# Patient Record
Sex: FEMALE | Race: BLACK OR AFRICAN AMERICAN | NOT HISPANIC OR LATINO | Employment: OTHER | RURAL
[De-identification: names, ages, dates, MRNs, and addresses within clinical notes are randomized per-mention and may not be internally consistent; named-entity substitution may affect disease eponyms.]

---

## 2017-11-17 ENCOUNTER — HISTORICAL (OUTPATIENT)
Dept: ADMINISTRATIVE | Facility: HOSPITAL | Age: 67
End: 2017-11-17

## 2017-11-20 LAB
LAB AP CLINICAL INFORMATION: NORMAL
LAB AP DIAGNOSIS - HISTORICAL: NORMAL
LAB AP GROSS PATHOLOGY - HISTORICAL: NORMAL
LAB AP SPECIMEN SUBMITTED - HISTORICAL: NORMAL

## 2022-11-28 ENCOUNTER — TELEPHONE (OUTPATIENT)
Dept: GASTROENTEROLOGY | Facility: CLINIC | Age: 72
End: 2022-11-28
Payer: MEDICARE

## 2022-11-28 DIAGNOSIS — Z86.010 HX OF COLONIC POLYPS: Primary | ICD-10-CM

## 2022-12-01 LAB — HM MAMMOGRAM: NORMAL

## 2022-12-19 ENCOUNTER — HOSPITAL ENCOUNTER (EMERGENCY)
Facility: HOSPITAL | Age: 72
Discharge: HOME | End: 2022-12-19
Payer: MEDICARE

## 2022-12-19 VITALS
WEIGHT: 192 LBS | RESPIRATION RATE: 20 BRPM | TEMPERATURE: 98 F | OXYGEN SATURATION: 98 % | BODY MASS INDEX: 30.13 KG/M2 | HEART RATE: 94 BPM | DIASTOLIC BLOOD PRESSURE: 105 MMHG | HEIGHT: 67 IN | SYSTOLIC BLOOD PRESSURE: 166 MMHG

## 2022-12-19 DIAGNOSIS — M79.641 RIGHT HAND PAIN: ICD-10-CM

## 2022-12-19 DIAGNOSIS — M77.9 TENDONITIS: Primary | ICD-10-CM

## 2022-12-19 PROCEDURE — 99282 EMERGENCY DEPT VISIT SF MDM: CPT | Performed by: FAMILY MEDICINE

## 2022-12-19 PROCEDURE — 99281 EMR DPT VST MAYX REQ PHY/QHP: CPT

## 2022-12-19 RX ORDER — GLIPIZIDE 2.5 MG/1
2.5 TABLET, EXTENDED RELEASE ORAL EVERY MORNING
COMMUNITY
Start: 2022-11-21 | End: 2023-08-28 | Stop reason: SDUPTHER

## 2022-12-19 RX ORDER — CANDESARTAN CILEXETIL AND HYDROCHLOROTHIAZIDE 32; 25 MG/1; MG/1
1 TABLET ORAL
COMMUNITY
Start: 2022-11-22 | End: 2023-08-28 | Stop reason: SDUPTHER

## 2022-12-19 RX ORDER — PEN NEEDLE, DIABETIC 31 GX5/16"
NEEDLE, DISPOSABLE MISCELLANEOUS 2 TIMES DAILY
COMMUNITY
Start: 2022-11-22 | End: 2023-08-28 | Stop reason: SDUPTHER

## 2022-12-19 RX ORDER — INSULIN DEGLUDEC 200 U/ML
56 INJECTION, SOLUTION SUBCUTANEOUS DAILY
COMMUNITY
Start: 2022-11-22 | End: 2023-08-28 | Stop reason: SDUPTHER

## 2022-12-19 RX ORDER — MONTELUKAST SODIUM 10 MG/1
10 TABLET ORAL
COMMUNITY
Start: 2022-11-22 | End: 2024-02-28 | Stop reason: SDUPTHER

## 2022-12-19 RX ORDER — ROSUVASTATIN CALCIUM 10 MG/1
10 TABLET, COATED ORAL DAILY
COMMUNITY
Start: 2022-09-29 | End: 2023-08-28 | Stop reason: SDUPTHER

## 2022-12-19 RX ORDER — FLUTICASONE PROPIONATE 50 MCG
SPRAY, SUSPENSION (ML) NASAL
COMMUNITY
Start: 2022-11-22 | End: 2024-02-28

## 2022-12-19 RX ORDER — BLOOD SUGAR DIAGNOSTIC
STRIP MISCELLANEOUS 2 TIMES DAILY
COMMUNITY
Start: 2022-08-09 | End: 2023-08-28 | Stop reason: SDUPTHER

## 2022-12-19 RX ORDER — LIRAGLUTIDE 6 MG/ML
1.2 INJECTION SUBCUTANEOUS NIGHTLY
COMMUNITY
Start: 2022-11-21 | End: 2023-08-28 | Stop reason: SDUPTHER

## 2022-12-19 NOTE — ED PROVIDER NOTES
Encounter Date: 12/19/2022       History     Chief Complaint   Patient presents with    Hand Pain     X 3 weeks     Patient comes in with pain in both of her hands.  The majority of the pain is coming from her right thumb.  She seems to have a positive Finkelstein's test on the right and probably  dequavans tenosynovitis      Review of patient's allergies indicates:  No Known Allergies  Past Medical History:   Diagnosis Date    Breast cancer in female     Cancer     Diabetes mellitus     Hypertension     Mixed hyperlipidemia      History reviewed. No pertinent surgical history.  History reviewed. No pertinent family history.  Social History     Tobacco Use    Smoking status: Never    Smokeless tobacco: Never   Substance Use Topics    Drug use: Never     Review of Systems   Constitutional: Negative.  Negative for fever.   HENT: Negative.  Negative for sore throat.    Eyes: Negative.    Respiratory: Negative.  Negative for shortness of breath.    Cardiovascular: Negative.  Negative for chest pain.   Gastrointestinal: Negative.  Negative for nausea.   Endocrine: Negative.    Genitourinary: Negative.  Negative for dysuria.   Musculoskeletal: Negative.  Negative for back pain.   Skin: Negative.  Negative for rash.   Allergic/Immunologic: Negative.    Neurological: Negative.  Negative for weakness.   Hematological: Negative.  Does not bruise/bleed easily.   Psychiatric/Behavioral: Negative.     All other systems reviewed and are negative.    Physical Exam     Initial Vitals [12/19/22 1415]   BP Pulse Resp Temp SpO2   (!) 166/105 94 20 97.8 °F (36.6 °C) 98 %      MAP       --         Physical Exam    Constitutional: She appears well-developed and well-nourished.   HENT:   Head: Normocephalic and atraumatic.   Right Ear: External ear normal.   Left Ear: External ear normal.   Nose: Nose normal.   Mouth/Throat: Oropharynx is clear and moist.   Eyes: Conjunctivae and EOM are normal. Pupils are equal, round, and reactive to  light.   Neck: Neck supple.   Normal range of motion.  Cardiovascular:  Normal rate, regular rhythm, normal heart sounds and intact distal pulses.           Pulmonary/Chest: Breath sounds normal.   Abdominal: Abdomen is soft. Bowel sounds are normal.   Genitourinary:    Vagina and uterus normal.     Musculoskeletal:         General: Normal range of motion.      Cervical back: Normal range of motion and neck supple.      Comments: Positive Finkelstein's study on the right will need to do a spur cock-up splint of the thumb a script was written for her to go get 1 healed.     Neurological: She is alert and oriented to person, place, and time. She has normal strength and normal reflexes.   Skin: Skin is warm. Capillary refill takes less than 2 seconds.   Psychiatric: She has a normal mood and affect. Her behavior is normal. Judgment and thought content normal.       Medical Screening Exam   See Full Note    ED Course   Procedures  Labs Reviewed - No data to display       Imaging Results    None          Medications - No data to display                    Clinical Impression:   Final diagnoses:  [M77.9] Tendonitis (Primary)  [M79.641] Right hand pain               Wil Napoles, DO  12/19/22 1536

## 2022-12-19 NOTE — ED TRIAGE NOTES
C/o pain in bilateral hand and wrists. States is having a hard time gripping things and this has been going on x 3 weeks.

## 2022-12-28 ENCOUNTER — ANESTHESIA EVENT (OUTPATIENT)
Dept: GASTROENTEROLOGY | Facility: HOSPITAL | Age: 72
End: 2022-12-28
Payer: MEDICARE

## 2022-12-28 ENCOUNTER — ANESTHESIA (OUTPATIENT)
Dept: GASTROENTEROLOGY | Facility: HOSPITAL | Age: 72
End: 2022-12-28
Payer: MEDICARE

## 2022-12-28 ENCOUNTER — HOSPITAL ENCOUNTER (OUTPATIENT)
Dept: GASTROENTEROLOGY | Facility: HOSPITAL | Age: 72
Discharge: HOME | End: 2022-12-28
Attending: INTERNAL MEDICINE
Payer: MEDICARE

## 2022-12-28 VITALS
DIASTOLIC BLOOD PRESSURE: 115 MMHG | HEART RATE: 87 BPM | OXYGEN SATURATION: 99 % | SYSTOLIC BLOOD PRESSURE: 143 MMHG | RESPIRATION RATE: 30 BRPM | TEMPERATURE: 98 F | HEIGHT: 67 IN | BODY MASS INDEX: 30.13 KG/M2 | WEIGHT: 192 LBS

## 2022-12-28 DIAGNOSIS — Z86.010 HX OF COLONIC POLYPS: ICD-10-CM

## 2022-12-28 DIAGNOSIS — Z12.11 SCREENING FOR COLON CANCER: Primary | ICD-10-CM

## 2022-12-28 DIAGNOSIS — K63.5 POLYP OF TRANSVERSE COLON, UNSPECIFIED TYPE: ICD-10-CM

## 2022-12-28 DIAGNOSIS — K57.30 DIVERTICULOSIS OF COLON: ICD-10-CM

## 2022-12-28 PROBLEM — Z86.0100 HX OF COLONIC POLYPS: Status: ACTIVE | Noted: 2022-12-28

## 2022-12-28 LAB — GLUCOSE SERPL-MCNC: 135 MG/DL (ref 70–105)

## 2022-12-28 PROCEDURE — 88305 SURGICAL PATHOLOGY: ICD-10-PCS | Mod: 26,,, | Performed by: PATHOLOGY

## 2022-12-28 PROCEDURE — 88305 TISSUE EXAM BY PATHOLOGIST: CPT | Mod: TC,SUR | Performed by: INTERNAL MEDICINE

## 2022-12-28 PROCEDURE — 27000284 HC CANNULA NASAL: Performed by: NURSE ANESTHETIST, CERTIFIED REGISTERED

## 2022-12-28 PROCEDURE — 45380 COLONOSCOPY AND BIOPSY: CPT | Mod: PT | Performed by: INTERNAL MEDICINE

## 2022-12-28 PROCEDURE — D9220A PRA ANESTHESIA: ICD-10-PCS | Mod: PT,,, | Performed by: NURSE ANESTHETIST, CERTIFIED REGISTERED

## 2022-12-28 PROCEDURE — 88305 TISSUE EXAM BY PATHOLOGIST: CPT | Mod: 26,,, | Performed by: PATHOLOGY

## 2022-12-28 PROCEDURE — 82962 GLUCOSE BLOOD TEST: CPT

## 2022-12-28 PROCEDURE — 82962 GLUCOSE BLOOD TEST: CPT | Mod: 91

## 2022-12-28 PROCEDURE — 63600175 PHARM REV CODE 636 W HCPCS: Performed by: NURSE ANESTHETIST, CERTIFIED REGISTERED

## 2022-12-28 PROCEDURE — 25000003 PHARM REV CODE 250: Performed by: NURSE ANESTHETIST, CERTIFIED REGISTERED

## 2022-12-28 PROCEDURE — 37000009 HC ANESTHESIA EA ADD 15 MINS

## 2022-12-28 PROCEDURE — 37000008 HC ANESTHESIA 1ST 15 MINUTES

## 2022-12-28 PROCEDURE — 27201423 OPTIME MED/SURG SUP & DEVICES STERILE SUPPLY

## 2022-12-28 PROCEDURE — D9220A PRA ANESTHESIA: Mod: PT,,, | Performed by: NURSE ANESTHETIST, CERTIFIED REGISTERED

## 2022-12-28 RX ORDER — SODIUM CHLORIDE 0.9 % (FLUSH) 0.9 %
10 SYRINGE (ML) INJECTION
Status: DISCONTINUED | OUTPATIENT
Start: 2022-12-28 | End: 2022-12-29 | Stop reason: HOSPADM

## 2022-12-28 RX ORDER — LIDOCAINE HYDROCHLORIDE 20 MG/ML
INJECTION, SOLUTION EPIDURAL; INFILTRATION; INTRACAUDAL; PERINEURAL
Status: DISCONTINUED | OUTPATIENT
Start: 2022-12-28 | End: 2022-12-28

## 2022-12-28 RX ORDER — PROPOFOL 10 MG/ML
VIAL (ML) INTRAVENOUS
Status: DISCONTINUED | OUTPATIENT
Start: 2022-12-28 | End: 2022-12-28

## 2022-12-28 RX ADMIN — PROPOFOL 30 MG: 10 INJECTION, EMULSION INTRAVENOUS at 01:12

## 2022-12-28 RX ADMIN — PROPOFOL 20 MG: 10 INJECTION, EMULSION INTRAVENOUS at 01:12

## 2022-12-28 RX ADMIN — SODIUM CHLORIDE: 9 INJECTION, SOLUTION INTRAVENOUS at 12:12

## 2022-12-28 RX ADMIN — PROPOFOL 100 MG: 10 INJECTION, EMULSION INTRAVENOUS at 01:12

## 2022-12-28 RX ADMIN — LIDOCAINE HYDROCHLORIDE 100 MG: 20 INJECTION, SOLUTION INTRAVENOUS at 01:12

## 2022-12-28 RX ADMIN — PROPOFOL 40 MG: 10 INJECTION, EMULSION INTRAVENOUS at 01:12

## 2022-12-28 NOTE — H&P
[facility]  Gastroenterology  H&P    Patient Name: Luna Wolf  MRN: [id number]  Admission Date: 12/28/2022  Code Status: Full Code    Attending Provider: Mark Norman MD   Primary Care Physician: Primary Doctor No  Principal Problem:<principal problem not specified>    Subjective:     History of Present Illness: Pt has history of colon polyps. Her last colonoscopy was five years ago.    Past Medical History:   Diagnosis Date    Breast cancer in female     Cancer     Diabetes mellitus     Hypertension     Mixed hyperlipidemia        Past Surgical History:   Procedure Laterality Date    BREAST BIOPSY      TUBAL LIGATION         Review of patient's allergies indicates:  No Known Allergies  Family History    None       Tobacco Use    Smoking status: Never    Smokeless tobacco: Never   Substance and Sexual Activity    Alcohol use: Never    Drug use: Never    Sexual activity: Not on file     Review of Systems   Respiratory: Negative.     Cardiovascular: Negative.    Gastrointestinal: Negative.    Objective:     Vital Signs (Most Recent):  Pulse: 100 (12/28/22 1235)  Resp: 16 (12/28/22 1235)  BP: 123/75 (12/28/22 1235)  SpO2: 97 % (12/28/22 1235) Vital Signs (24h Range):  Pulse:  [100] 100  Resp:  [16] 16  SpO2:  [97 %] 97 %  BP: (123)/(75) 123/75     Weight: 87.1 kg (192 lb) (12/28/22 1235)  Body mass index is 30.07 kg/m².    No intake or output data in the 24 hours ending 12/28/22 1314    Lines/Drains/Airways       Peripheral Intravenous Line  Duration                  Peripheral IV - Single Lumen 12/28/22 1235 22 G Anterior;Left Forearm <1 day                    Physical Exam  Vitals reviewed.   Constitutional:       General: She is not in acute distress.     Appearance: Normal appearance. She is well-developed. She is not ill-appearing.   HENT:      Head: Normocephalic and atraumatic.      Nose: Nose normal.   Eyes:      Pupils: Pupils are equal, round, and reactive to light.   Cardiovascular:      Rate  and Rhythm: Normal rate and regular rhythm.   Pulmonary:      Effort: Pulmonary effort is normal.      Breath sounds: Normal breath sounds. No wheezing.   Abdominal:      General: Abdomen is flat. Bowel sounds are normal. There is no distension.      Palpations: Abdomen is soft.      Tenderness: There is no abdominal tenderness. There is no guarding.   Skin:     General: Skin is warm and dry.      Coloration: Skin is not jaundiced.   Neurological:      Mental Status: She is alert.   Psychiatric:         Attention and Perception: Attention normal.         Mood and Affect: Affect normal.         Speech: Speech normal.         Behavior: Behavior is cooperative.      Comments: Pt was calm while speaking.       Significant Labs:  CBC: No results for input(s): WBC, HGB, HCT, PLT in the last 48 hours.  CMP: No results for input(s): GLU, CALCIUM, ALBUMIN, PROT, NA, K, CO2, CL, BUN, CREATININE, ALKPHOS, ALT, AST, BILITOT in the last 48 hours.    Significant Imaging:  Imaging results within the past 24 hours have been reviewed.    Assessment/Plan:     There are no hospital problems to display for this patient.        Imp: History of colon polyps  Plan: colonoscopy    Mark Norman MD  Gastroenterology  [facility]

## 2022-12-28 NOTE — ANESTHESIA PREPROCEDURE EVALUATION
"                                                                                                             12/28/2022  Luna Wolf is a 72 y.o., female.    Past Medical History:   Diagnosis Date    Breast cancer in female     Cancer     Diabetes mellitus     Hypertension     Mixed hyperlipidemia        Past Surgical History:   Procedure Laterality Date    BREAST BIOPSY      TUBAL LIGATION         History reviewed. No pertinent family history.    Social History     Socioeconomic History    Marital status: Single   Tobacco Use    Smoking status: Never    Smokeless tobacco: Never   Substance and Sexual Activity    Alcohol use: Never    Drug use: Never       Current Outpatient Medications   Medication Sig Dispense Refill    ATACAND HCT 32-25 mg Tab Take 1 tablet by mouth.      ACCU-CHEK GAY PLUS TEST STRP Strp 2 (two) times daily. Test      BD ULTRA-FINE MINI PEN NEEDLE 31 gauge x 3/16" Ndle 2 (two) times daily.      fluticasone propionate (FLONASE) 50 mcg/actuation nasal spray by Each Nostril route.      glipiZIDE (GLUCOTROL) 2.5 MG TR24 Take 2.5 mg by mouth every morning.      montelukast (SINGULAIR) 10 mg tablet Take 10 mg by mouth.      rosuvastatin (CRESTOR) 10 MG tablet Take 10 mg by mouth.      TRESIBA FLEXTOUCH U-200 200 unit/mL (3 mL) insulin pen 60 units      VICTOZA 3-MAJO 0.6 mg/0.1 mL (18 mg/3 mL) PnIj pen Inject 1.8 mg into the skin.       No current facility-administered medications for this encounter.       Review of patient's allergies indicates:  No Known Allergies    Pre-op Assessment    I have reviewed the Patient Summary Reports.     I have reviewed the Nursing Notes. I have reviewed the NPO Status.   I have reviewed the Medications.     Review of Systems      Physical Exam    Airway:  Mallampati: III / II  Mouth Opening: Normal  TM Distance: 4 - 6 cm  Tongue: Normal  Neck ROM: Normal ROM        Anesthesia Plan  Type of Anesthesia, risks & benefits discussed:    Anesthesia " Type: Gen Natural Airway  Intra-op Monitoring Plan: Standard ASA Monitors  Post Op Pain Control Plan: multimodal analgesia  Induction:  IV  Informed Consent: Informed consent signed with the Patient and all parties understand the risks and agree with anesthesia plan.  All questions answered. Patient consented to blood products? Yes  ASA Score: 3  Day of Surgery Review of History & Physical: H&P Update referred to the surgeon/provider.    Ready For Surgery From Anesthesia Perspective.     .

## 2022-12-28 NOTE — DISCHARGE INSTRUCTIONS
12/28/22     Impression  Overall Impression: Scattered diverticula are present in the sigmoid, descending and transverse colon. One diminutive transverse colon polyp was removed with biopsy.     Recommendation    Await pathology results     Repeat colonoscopy in 5 years      High fiber diet  Disp: DC to home in stable condition. Resume diet and activity. No driving x 24 hours. F/U with PCP as scheduled. Dx: History of colon polyps, colon diverticulosis, one polyp was removed on this exam  .Drink fluids, no operating heavy machinery, driving, or signing legal documents until tomorrow.

## 2022-12-28 NOTE — TRANSFER OF CARE
"Anesthesia Transfer of Care Note    Patient: Luna Wolf    Procedure(s) Performed: Colonoscopy    Patient location: GI    Anesthesia Type: general    Transport from OR: Transported from OR on room air with adequate spontaneous ventilation    Post pain: adequate analgesia    Post assessment: no apparent anesthetic complications and tolerated procedure well    Post vital signs: stable    Level of consciousness: responds to stimulation and oriented    Nausea/Vomiting: no nausea/vomiting    Complications: none    Transfer of care protocol was followed      Last vitals:   Visit Vitals  /78 (BP Location: Left leg, Patient Position: Lying)   Pulse 82   Temp 36.6 °C (97.8 °F) (Skin)   Resp 16   Ht 5' 7" (1.702 m)   Wt 87.1 kg (192 lb)   SpO2 100%   Breastfeeding No   BMI 30.07 kg/m²     "

## 2022-12-28 NOTE — ANESTHESIA POSTPROCEDURE EVALUATION
Anesthesia Post Evaluation    Patient: Luna Wolf    Procedure(s) Performed: Colonoscopy    Final Anesthesia Type: general      Patient location during evaluation: GI PACU  Patient participation: Yes- Able to Participate  Level of consciousness: awake and alert  Post-procedure vital signs: reviewed and stable  Pain management: adequate  Airway patency: patent    PONV status at discharge: No PONV  Anesthetic complications: no      Cardiovascular status: stable  Respiratory status: unassisted  Hydration status: euvolemic  Follow-up not needed.          Vitals Value Taken Time   /87 12/28/22 1356   Temp 36.6 °C (97.8 °F) 12/28/22 1330   Pulse 91 12/28/22 1358   Resp 21 12/28/22 1358   SpO2 99 % 12/28/22 1356   Vitals shown include unvalidated device data.      No case tracking events are documented in the log.      Pain/Lilo Score: Lilo Score: 9 (12/28/2022  1:35 PM)

## 2022-12-29 LAB
ESTROGEN SERPL-MCNC: NORMAL PG/ML
INSULIN SERPL-ACNC: NORMAL U[IU]/ML
LAB AP GROSS DESCRIPTION: NORMAL
LAB AP LABORATORY NOTES: NORMAL
T3RU NFR SERPL: NORMAL %

## 2022-12-30 ENCOUNTER — TELEPHONE (OUTPATIENT)
Dept: GASTROENTEROLOGY | Facility: CLINIC | Age: 72
End: 2022-12-30
Payer: MEDICARE

## 2022-12-30 NOTE — TELEPHONE ENCOUNTER
Called patient to discuss results and verbalized understanding.        ----- Message from Mark Norman MD sent at 12/29/2022  4:52 PM CST -----  Place pt on list for colonoscopy in 5 years; polyp was ta.

## 2023-01-26 ENCOUNTER — OFFICE VISIT (OUTPATIENT)
Dept: ORTHOPEDICS | Facility: CLINIC | Age: 73
End: 2023-01-26
Payer: MEDICARE

## 2023-01-26 ENCOUNTER — HOSPITAL ENCOUNTER (OUTPATIENT)
Dept: RADIOLOGY | Facility: HOSPITAL | Age: 73
Discharge: HOME | End: 2023-01-26
Attending: ORTHOPAEDIC SURGERY
Payer: MEDICARE

## 2023-01-26 VITALS — BODY MASS INDEX: 30.13 KG/M2 | HEIGHT: 67 IN | WEIGHT: 192 LBS

## 2023-01-26 DIAGNOSIS — M79.641 RIGHT HAND PAIN: ICD-10-CM

## 2023-01-26 DIAGNOSIS — M19.031 OSTEOARTHRITIS OF RIGHT WRIST, UNSPECIFIED OSTEOARTHRITIS TYPE: Primary | ICD-10-CM

## 2023-01-26 DIAGNOSIS — M77.9 TENDONITIS: ICD-10-CM

## 2023-01-26 PROCEDURE — 99203 PR OFFICE/OUTPT VISIT, NEW, LEVL III, 30-44 MIN: ICD-10-PCS | Mod: S$PBB,,, | Performed by: ORTHOPAEDIC SURGERY

## 2023-01-26 PROCEDURE — 73130 X-RAY EXAM OF HAND: CPT | Mod: 26,RT,, | Performed by: ORTHOPAEDIC SURGERY

## 2023-01-26 PROCEDURE — 73130 X-RAY EXAM OF HAND: CPT | Mod: TC,RT

## 2023-01-26 PROCEDURE — 73130 XR HAND COMPLETE 3 VIEW RIGHT: ICD-10-PCS | Mod: 26,RT,, | Performed by: ORTHOPAEDIC SURGERY

## 2023-01-26 PROCEDURE — 99213 OFFICE O/P EST LOW 20 MIN: CPT | Mod: PBBFAC | Performed by: ORTHOPAEDIC SURGERY

## 2023-01-26 PROCEDURE — 3008F BODY MASS INDEX DOCD: CPT | Mod: CPTII,,, | Performed by: ORTHOPAEDIC SURGERY

## 2023-01-26 PROCEDURE — 3008F PR BODY MASS INDEX (BMI) DOCUMENTED: ICD-10-PCS | Mod: CPTII,,, | Performed by: ORTHOPAEDIC SURGERY

## 2023-01-26 PROCEDURE — 99203 OFFICE O/P NEW LOW 30 MIN: CPT | Mod: S$PBB,,, | Performed by: ORTHOPAEDIC SURGERY

## 2023-01-26 NOTE — PROGRESS NOTES
"    HPI:   Luna Wolf is a pleasant 72 y.o. patient who reports to clinic for evaluation of bilateral hand pain and swelling.  Her right hand is the most significantly affected by her left hand symptomatic as well.  States she has a difficult time gripping and grasping objects.  The pain was so bad that she went to the emergency department.  They diagnosed her with de Quervain tendinitis.  No x-rays were performed at that time.  Denies any acute traumatic event.  Injury onset and description: none  Patient's occupation: Retired  This is not a work related injury.   This injury has been non-responsive to conservative care. The pain is worse with repetitive use, and strenuous activity is very difficult.  her pain improves with rest.  she rates pain as a  5/10on the Visual Analog Scale.        PAST MEDICAL HISTORY:   Past Medical History:   Diagnosis Date    Breast cancer in female     Cancer     Diabetes mellitus     Hypertension     Mixed hyperlipidemia      PAST SURGICAL HISTORY:   Past Surgical History:   Procedure Laterality Date    BREAST BIOPSY      TUBAL LIGATION       MEDICATIONS:    Current Outpatient Medications:     ACCU-CHEK GAY PLUS TEST STRP Strp, 2 (two) times daily. Test, Disp: , Rfl:     ATACAND HCT 32-25 mg Tab, Take 1 tablet by mouth., Disp: , Rfl:     BD ULTRA-FINE MINI PEN NEEDLE 31 gauge x 3/16" Ndle, 2 (two) times daily., Disp: , Rfl:     fluticasone propionate (FLONASE) 50 mcg/actuation nasal spray, by Each Nostril route., Disp: , Rfl:     glipiZIDE (GLUCOTROL) 2.5 MG TR24, Take 2.5 mg by mouth every morning., Disp: , Rfl:     montelukast (SINGULAIR) 10 mg tablet, Take 10 mg by mouth., Disp: , Rfl:     rosuvastatin (CRESTOR) 10 MG tablet, Take 10 mg by mouth., Disp: , Rfl:     TRESIBA FLEXTOUCH U-200 200 unit/mL (3 mL) insulin pen, 60 units, Disp: , Rfl:     VICTOZA 3-MAJO 0.6 mg/0.1 mL (18 mg/3 mL) PnIj pen, Inject 1.8 mg into the skin., Disp: , Rfl:   ALLERGIES:   Review of patient's " "allergies indicates:  No Known Allergies      PHYSICAL EXAM:  VITAL SIGNS: Ht 5' 7" (1.702 m)   Wt 87.1 kg (192 lb)   BMI 30.07 kg/m²   General: Well-developed well-nourished 72 y.o. femalein no acute distress;Cardiovascular: Regular rhythm by palpation of distal pulse, normal color and temperature, no concerning varicosities on symptomatic side Lungs: No labored breathing or wheezing appreciated Neuro: Alert and oriented ×3 Psychiatric: well oriented to person, place and time, demonstrates normal mood and affect Skin: No rashes, lesions or ulcers, normal temperature, turgor, and texture on uninvolved extremity    Ortho/SPM Exam  Her hand was inspected today.  She has marked deformity seen to the 1st CMC joint and radiocarpal joint of the right hand and also to the left hand as well the to a lesser degree.  Limited radiocarpal range of motion is present bilaterally.  Positive thumb CMC grind test is present bilaterally.   strength appears to be greatly diminished compared to normal.  She is neurovascularly intact.  Somewhat diminished sensation in the small finger and ring finger bilaterally    IMAGING:  X-Ray Hand 3 View Right    Result Date: 1/26/2023  See Procedure Notes for results. IMPRESSION: Please see Ortho procedure notes for report.  This procedure was auto-finalized by: Virtual Radiologist  Radiographs right hand were obtained today demonstrating severe 1st CMC joint osteoarthritis with severe degenerative changes present throughout the radiocarpal joint.  ASSESSMENT:      ICD-10-CM ICD-9-CM   1. Osteoarthritis of right wrist, unspecified osteoarthritis type  M19.031 715.93   2. Tendonitis  M77.9 726.90       PLAN:     -Findings and treatment options were discussed with the patient  -All questions answered  Severe wrist arthritis-patient requested a wrist brace and this was given today.  Cannot tolerated NSAIDS- stomach ulcers occur  Recommend topical voltaren gel  Mentioned potential surgical " interventions-- she is not interested  Injections discussed-- multiple sources of OA-- CMC joint, wrist. Difficult to address all of these areas of arthritis with 1 or even 2 injections.     There are no Patient Instructions on file for this visit.  Orders Placed This Encounter   Procedures    X-Ray Hand 3 View Right     Standing Status:   Future     Number of Occurrences:   1     Standing Expiration Date:   1/26/2024     Order Specific Question:   May the Radiologist modify the order per protocol to meet the clinical needs of the patient?     Answer:   Yes     Order Specific Question:   Release to patient     Answer:   Immediate     Procedures

## 2023-03-16 ENCOUNTER — OUTSIDE PLACE OF SERVICE (OUTPATIENT)
Dept: CARDIOLOGY | Facility: HOSPITAL | Age: 73
End: 2023-03-16
Payer: MEDICARE

## 2023-03-16 PROCEDURE — 93010 PR ELECTROCARDIOGRAM REPORT: ICD-10-PCS | Mod: ,,, | Performed by: INTERNAL MEDICINE

## 2023-03-16 PROCEDURE — 93010 ELECTROCARDIOGRAM REPORT: CPT | Mod: ,,, | Performed by: INTERNAL MEDICINE

## 2023-04-06 ENCOUNTER — OUTSIDE PLACE OF SERVICE (OUTPATIENT)
Dept: CARDIOLOGY | Facility: HOSPITAL | Age: 73
End: 2023-04-06
Payer: MEDICARE

## 2023-04-06 PROCEDURE — 93010 ELECTROCARDIOGRAM REPORT: CPT | Mod: ,,, | Performed by: INTERNAL MEDICINE

## 2023-04-06 PROCEDURE — 93010 PR ELECTROCARDIOGRAM REPORT: ICD-10-PCS | Mod: ,,, | Performed by: INTERNAL MEDICINE

## 2023-04-10 ENCOUNTER — HOSPITAL ENCOUNTER (OUTPATIENT)
Dept: CARDIOLOGY | Facility: HOSPITAL | Age: 73
Discharge: HOME | End: 2023-04-10
Attending: INTERNAL MEDICINE

## 2023-04-10 DIAGNOSIS — R60.0 LOCALIZED EDEMA: ICD-10-CM

## 2023-04-10 DIAGNOSIS — R60.0 LOCALIZED EDEMA: Primary | ICD-10-CM

## 2023-04-10 PROCEDURE — 93306 TTE W/DOPPLER COMPLETE: CPT | Mod: 26,,, | Performed by: INTERNAL MEDICINE

## 2023-04-10 PROCEDURE — 93306 CV EXTERNAL ECHO (CUPID ONLY): ICD-10-PCS | Mod: 26,,, | Performed by: INTERNAL MEDICINE

## 2023-04-11 LAB — EJECTION FRACTION: 70 %

## 2023-08-28 ENCOUNTER — OFFICE VISIT (OUTPATIENT)
Dept: FAMILY MEDICINE | Facility: CLINIC | Age: 73
End: 2023-08-28
Payer: MEDICARE

## 2023-08-28 VITALS
BODY MASS INDEX: 22.91 KG/M2 | RESPIRATION RATE: 18 BRPM | OXYGEN SATURATION: 97 % | TEMPERATURE: 99 F | HEIGHT: 68 IN | WEIGHT: 151.19 LBS | DIASTOLIC BLOOD PRESSURE: 81 MMHG | HEART RATE: 106 BPM | SYSTOLIC BLOOD PRESSURE: 144 MMHG

## 2023-08-28 DIAGNOSIS — E11.69 TYPE 2 DIABETES MELLITUS WITH OTHER SPECIFIED COMPLICATION, WITH LONG-TERM CURRENT USE OF INSULIN: Primary | ICD-10-CM

## 2023-08-28 DIAGNOSIS — Z13.820 SCREENING FOR OSTEOPOROSIS: ICD-10-CM

## 2023-08-28 DIAGNOSIS — Z78.0 ASYMPTOMATIC MENOPAUSAL STATE: ICD-10-CM

## 2023-08-28 DIAGNOSIS — M48.02 CERVICAL SPINAL STENOSIS: Chronic | ICD-10-CM

## 2023-08-28 DIAGNOSIS — Z11.59 ENCOUNTER FOR HEPATITIS C SCREENING TEST FOR LOW RISK PATIENT: ICD-10-CM

## 2023-08-28 DIAGNOSIS — Z79.4 TYPE 2 DIABETES MELLITUS WITH OTHER SPECIFIED COMPLICATION, WITH LONG-TERM CURRENT USE OF INSULIN: Primary | ICD-10-CM

## 2023-08-28 DIAGNOSIS — I10 ESSENTIAL HYPERTENSION: ICD-10-CM

## 2023-08-28 DIAGNOSIS — H91.8X3 OTHER SPECIFIED HEARING LOSS OF BOTH EARS: ICD-10-CM

## 2023-08-28 DIAGNOSIS — E78.2 MIXED HYPERLIPIDEMIA: ICD-10-CM

## 2023-08-28 PROBLEM — Z86.0100 HX OF COLONIC POLYPS: Chronic | Status: ACTIVE | Noted: 2022-12-28

## 2023-08-28 PROBLEM — Z12.11 SCREENING FOR COLON CANCER: Status: RESOLVED | Noted: 2022-12-28 | Resolved: 2023-08-28

## 2023-08-28 PROBLEM — E11.9 TYPE 2 DIABETES MELLITUS, WITH LONG-TERM CURRENT USE OF INSULIN: Chronic | Status: ACTIVE | Noted: 2023-08-28

## 2023-08-28 PROBLEM — Z86.010 HX OF COLONIC POLYPS: Chronic | Status: ACTIVE | Noted: 2022-12-28

## 2023-08-28 PROBLEM — K57.30 DIVERTICULOSIS OF COLON: Chronic | Status: ACTIVE | Noted: 2022-12-28

## 2023-08-28 PROBLEM — K63.5 POLYP OF TRANSVERSE COLON: Status: RESOLVED | Noted: 2022-12-28 | Resolved: 2023-08-28

## 2023-08-28 PROCEDURE — 86803 HEPATITIS C ANTIBODY: ICD-10-PCS | Mod: ,,, | Performed by: CLINICAL MEDICAL LABORATORY

## 2023-08-28 PROCEDURE — 82043 UR ALBUMIN QUANTITATIVE: CPT | Mod: ,,, | Performed by: CLINICAL MEDICAL LABORATORY

## 2023-08-28 PROCEDURE — 86803 HEPATITIS C AB TEST: CPT | Mod: ,,, | Performed by: CLINICAL MEDICAL LABORATORY

## 2023-08-28 PROCEDURE — 4010F ACE/ARB THERAPY RXD/TAKEN: CPT | Mod: ,,, | Performed by: FAMILY MEDICINE

## 2023-08-28 PROCEDURE — 80053 COMPREHENSIVE METABOLIC PANEL: ICD-10-PCS | Mod: ,,, | Performed by: CLINICAL MEDICAL LABORATORY

## 2023-08-28 PROCEDURE — 1160F RVW MEDS BY RX/DR IN RCRD: CPT | Mod: ,,, | Performed by: FAMILY MEDICINE

## 2023-08-28 PROCEDURE — 3079F PR MOST RECENT DIASTOLIC BLOOD PRESSURE 80-89 MM HG: ICD-10-PCS | Mod: ,,, | Performed by: FAMILY MEDICINE

## 2023-08-28 PROCEDURE — 85025 COMPLETE CBC W/AUTO DIFF WBC: CPT | Mod: ,,, | Performed by: CLINICAL MEDICAL LABORATORY

## 2023-08-28 PROCEDURE — 1159F PR MEDICATION LIST DOCUMENTED IN MEDICAL RECORD: ICD-10-PCS | Mod: ,,, | Performed by: FAMILY MEDICINE

## 2023-08-28 PROCEDURE — 3288F PR FALLS RISK ASSESSMENT DOCUMENTED: ICD-10-PCS | Mod: ,,, | Performed by: FAMILY MEDICINE

## 2023-08-28 PROCEDURE — 80061 LIPID PANEL: ICD-10-PCS | Mod: ,,, | Performed by: CLINICAL MEDICAL LABORATORY

## 2023-08-28 PROCEDURE — 3077F PR MOST RECENT SYSTOLIC BLOOD PRESSURE >= 140 MM HG: ICD-10-PCS | Mod: ,,, | Performed by: FAMILY MEDICINE

## 2023-08-28 PROCEDURE — 3079F DIAST BP 80-89 MM HG: CPT | Mod: ,,, | Performed by: FAMILY MEDICINE

## 2023-08-28 PROCEDURE — 1101F PR PT FALLS ASSESS DOC 0-1 FALLS W/OUT INJ PAST YR: ICD-10-PCS | Mod: ,,, | Performed by: FAMILY MEDICINE

## 2023-08-28 PROCEDURE — 85025 CBC WITH DIFFERENTIAL: ICD-10-PCS | Mod: ,,, | Performed by: CLINICAL MEDICAL LABORATORY

## 2023-08-28 PROCEDURE — 3008F PR BODY MASS INDEX (BMI) DOCUMENTED: ICD-10-PCS | Mod: ,,, | Performed by: FAMILY MEDICINE

## 2023-08-28 PROCEDURE — 1159F MED LIST DOCD IN RCRD: CPT | Mod: ,,, | Performed by: FAMILY MEDICINE

## 2023-08-28 PROCEDURE — 82570 ASSAY OF URINE CREATININE: CPT | Mod: ,,, | Performed by: CLINICAL MEDICAL LABORATORY

## 2023-08-28 PROCEDURE — 1101F PT FALLS ASSESS-DOCD LE1/YR: CPT | Mod: ,,, | Performed by: FAMILY MEDICINE

## 2023-08-28 PROCEDURE — 80061 LIPID PANEL: CPT | Mod: ,,, | Performed by: CLINICAL MEDICAL LABORATORY

## 2023-08-28 PROCEDURE — 80053 COMPREHEN METABOLIC PANEL: CPT | Mod: ,,, | Performed by: CLINICAL MEDICAL LABORATORY

## 2023-08-28 PROCEDURE — 99204 OFFICE O/P NEW MOD 45 MIN: CPT | Mod: ,,, | Performed by: FAMILY MEDICINE

## 2023-08-28 PROCEDURE — 4010F PR ACE/ARB THEARPY RXD/TAKEN: ICD-10-PCS | Mod: ,,, | Performed by: FAMILY MEDICINE

## 2023-08-28 PROCEDURE — 1160F PR REVIEW ALL MEDS BY PRESCRIBER/CLIN PHARMACIST DOCUMENTED: ICD-10-PCS | Mod: ,,, | Performed by: FAMILY MEDICINE

## 2023-08-28 PROCEDURE — 83036 HEMOGLOBIN A1C: ICD-10-PCS | Mod: ,,, | Performed by: CLINICAL MEDICAL LABORATORY

## 2023-08-28 PROCEDURE — 3077F SYST BP >= 140 MM HG: CPT | Mod: ,,, | Performed by: FAMILY MEDICINE

## 2023-08-28 PROCEDURE — 83036 HEMOGLOBIN GLYCOSYLATED A1C: CPT | Mod: ,,, | Performed by: CLINICAL MEDICAL LABORATORY

## 2023-08-28 PROCEDURE — 82043 MICROALBUMIN / CREATININE RATIO URINE: ICD-10-PCS | Mod: ,,, | Performed by: CLINICAL MEDICAL LABORATORY

## 2023-08-28 PROCEDURE — 3288F FALL RISK ASSESSMENT DOCD: CPT | Mod: ,,, | Performed by: FAMILY MEDICINE

## 2023-08-28 PROCEDURE — 3008F BODY MASS INDEX DOCD: CPT | Mod: ,,, | Performed by: FAMILY MEDICINE

## 2023-08-28 PROCEDURE — 82570 MICROALBUMIN / CREATININE RATIO URINE: ICD-10-PCS | Mod: ,,, | Performed by: CLINICAL MEDICAL LABORATORY

## 2023-08-28 PROCEDURE — 99204 PR OFFICE/OUTPT VISIT, NEW, LEVL IV, 45-59 MIN: ICD-10-PCS | Mod: ,,, | Performed by: FAMILY MEDICINE

## 2023-08-28 RX ORDER — CANDESARTAN CILEXETIL AND HYDROCHLOROTHIAZIDE 32; 25 MG/1; MG/1
1 TABLET ORAL DAILY
Qty: 90 EACH | Refills: 1 | Status: SHIPPED | OUTPATIENT
Start: 2023-08-28 | End: 2024-02-28 | Stop reason: SDUPTHER

## 2023-08-28 RX ORDER — PEN NEEDLE, DIABETIC 31 GX5/16"
NEEDLE, DISPOSABLE MISCELLANEOUS
Qty: 100 EACH | Refills: 5 | Status: SHIPPED | OUTPATIENT
Start: 2023-08-28

## 2023-08-28 RX ORDER — BLOOD SUGAR DIAGNOSTIC
50 STRIP MISCELLANEOUS 2 TIMES DAILY
Qty: 50 STRIP | Refills: 12 | Status: SHIPPED | OUTPATIENT
Start: 2023-08-28

## 2023-08-28 RX ORDER — LIRAGLUTIDE 6 MG/ML
1.2 INJECTION SUBCUTANEOUS NIGHTLY
Qty: 3 ML | Refills: 5 | Status: SHIPPED | OUTPATIENT
Start: 2023-08-28 | End: 2024-02-28 | Stop reason: ALTCHOICE

## 2023-08-28 RX ORDER — INSULIN DEGLUDEC 200 U/ML
56 INJECTION, SOLUTION SUBCUTANEOUS DAILY
Qty: 4 PEN | Refills: 5 | Status: SHIPPED | OUTPATIENT
Start: 2023-08-28 | End: 2024-02-28 | Stop reason: SDUPTHER

## 2023-08-28 RX ORDER — ASPIRIN 81 MG/1
81 TABLET ORAL DAILY
COMMUNITY

## 2023-08-28 RX ORDER — GLIPIZIDE 2.5 MG/1
2.5 TABLET, EXTENDED RELEASE ORAL EVERY MORNING
Qty: 90 TABLET | Refills: 1 | Status: SHIPPED | OUTPATIENT
Start: 2023-08-28 | End: 2024-02-28 | Stop reason: SDUPTHER

## 2023-08-28 RX ORDER — ROSUVASTATIN CALCIUM 10 MG/1
10 TABLET, COATED ORAL NIGHTLY
Qty: 90 TABLET | Refills: 1 | Status: SHIPPED | OUTPATIENT
Start: 2023-08-28 | End: 2024-02-28 | Stop reason: SDUPTHER

## 2023-08-28 NOTE — PROGRESS NOTES
Clinic Note    Patient Name: Luna Wolf  : 1950  MRN: [id number]    Chief Complaint   Patient presents with    Establish Care    Medication Refill    Health Maintenance     Hepatitis C Screening Never done  Lipid Panel Never done  TETANUS VACCINE Never done  Mammogram Never done  DEXA Scan Never done  Shingles Vaccine(1 of 2) Never done  Pneumococcal Vaccines (Age 65+)(1 - PCV) Never done  COVID-19 Vaccine(3 - Pfizer series) due on 2021  Colorectal Cancer Screening due on 2023         HPI:    Ms. Luna Wolf is a 72 y.o. female who presents to clinic today with CC of need to establish care and follow up on chronic disease processes including Type II DM, HTN, HLD, and seasonal allergies.   BP is elevated today. Patient reports she was not on her regular medication while she was in rehab but has recently resumed her chronic medications and feels like this is improving.  Patient reports chronic issues are well controlled on current medication regimen.  Denies problems or side effects with medications.  Patient is accompanied to this visit by her daughter. Reports several hospitalizations over the past several months for inability to walk. She was ultimately diagnosed with cervical spinal stenosis. She had surgery with Dr. Wagoner (Neurosurgery) in . She was just discharged this past Wednesday after spending time in rehab.   She also follows with Dr. Kilpatrick (Neurology) at [facility].   She has followed up with Neurosurgery and has since been released.  Reports she is doing well but learning how to walk and get around again. Reports she is still using wheelchair and walker.   Patient is, otherwise, without complaints.     Medications:  Medication List with Changes/Refills   Current Medications    ASPIRIN (ECOTRIN) 81 MG EC TABLET    Take 81 mg by mouth once daily.    FLUTICASONE PROPIONATE (FLONASE) 50 MCG/ACTUATION NASAL SPRAY    by Each Nostril route.    MONTELUKAST (SINGULAIR) 10 MG TABLET  "   Take 10 mg by mouth.   Changed and/or Refilled Medications    Modified Medication Previous Medication    ACCU-CHEK GAY PLUS TEST STRP STRP ACCU-CHEK GAY PLUS TEST STRP Strp       50 strips by Other route 2 (two) times daily. Test    2 (two) times daily. Test    ATACAND HCT 32-25 MG TAB ATACAND HCT 32-25 mg Tab       Take 1 tablet by mouth once daily.    Take 1 tablet by mouth.    BD ULTRA-FINE MINI PEN NEEDLE 31 GAUGE X 3/16" NDLE BD ULTRA-FINE MINI PEN NEEDLE 31 gauge x 3/16" Ndle       For use with insulin/victoza.    2 (two) times daily.    GLIPIZIDE (GLUCOTROL) 2.5 MG TR24 glipiZIDE (GLUCOTROL) 2.5 MG TR24       Take 1 tablet (2.5 mg total) by mouth every morning.    Take 2.5 mg by mouth every morning.    ROSUVASTATIN (CRESTOR) 10 MG TABLET rosuvastatin (CRESTOR) 10 MG tablet       Take 1 tablet (10 mg total) by mouth every evening.    Take 10 mg by mouth once daily.    TRESIBA FLEXTOUCH U-200 200 UNIT/ML (3 ML) INSULIN PEN TRESIBA FLEXTOUCH U-200 200 unit/mL (3 mL) insulin pen       Inject 56 Units into the skin once daily. 56 units every am    56 Units once daily. 56 units every am    VICTOZA 3-MAJO 0.6 MG/0.1 ML (18 MG/3 ML) PNIJ PEN VICTOZA 3-MAJO 0.6 mg/0.1 mL (18 mg/3 mL) PnIj pen       Inject 1.2 mg into the skin every evening.    Inject 1.2 mg into the skin every evening.        Allergies: Ibuprofen      Past Medical History:    Past Medical History:   Diagnosis Date    Breast cancer in female     Cancer     Diabetes mellitus     Hypertension     Mixed hyperlipidemia        Past Surgical History:    Past Surgical History:   Procedure Laterality Date    BREAST BIOPSY      TUBAL LIGATION           Social History:    Social History     Tobacco Use   Smoking Status Never   Smokeless Tobacco Never     Social History     Substance and Sexual Activity   Alcohol Use Never     Social History     Substance and Sexual Activity   Drug Use Never         Family History:    Family History   Problem Relation Age of " "Onset    Diabetes Maternal Grandfather        Review of Systems:    Review of Systems   Constitutional:  Negative for appetite change, chills, fatigue, fever and unexpected weight change.   Eyes:  Negative for visual disturbance.   Respiratory:  Negative for cough and shortness of breath.    Cardiovascular:  Negative for chest pain and leg swelling.   Gastrointestinal:  Negative for abdominal pain, change in bowel habit, constipation, diarrhea, nausea, vomiting and change in bowel habit.   Musculoskeletal:  Negative for arthralgias.   Integumentary:  Negative for rash.   Neurological:  Negative for dizziness.        Reports occasional headaches   Psychiatric/Behavioral:  The patient is not nervous/anxious.         Vitals:    Vitals:    08/28/23 1422 08/28/23 1527   BP: (!) 140/90 (!) 144/81   BP Location: Left arm Left arm   Patient Position: Sitting Sitting   BP Method: Medium (Manual) Medium (Automatic)   Pulse: 106    Resp: 18    Temp: 99 °F (37.2 °C)    TempSrc: Oral    SpO2: 97%    Weight: 68.6 kg (151 lb 3.2 oz)    Height: 5' 7.5" (1.715 m)        Body mass index is 23.33 kg/m².    Wt Readings from Last 3 Encounters:   08/28/23 1422 68.6 kg (151 lb 3.2 oz)   01/26/23 1116 87.1 kg (192 lb)   12/28/22 1235 87.1 kg (192 lb)        Physical Exam:    Physical Exam  Constitutional:       General: She is not in acute distress.     Appearance: Normal appearance.   HENT:      Nose: Nose normal.      Mouth/Throat:      Mouth: Mucous membranes are moist.      Pharynx: Oropharynx is clear.   Eyes:      Conjunctiva/sclera: Conjunctivae normal.   Cardiovascular:      Rate and Rhythm: Normal rate and regular rhythm.      Heart sounds: Normal heart sounds. No murmur heard.  Pulmonary:      Effort: Pulmonary effort is normal. No respiratory distress.      Breath sounds: Normal breath sounds. No wheezing, rhonchi or rales.   Abdominal:      General: Bowel sounds are normal.      Palpations: Abdomen is soft.      Tenderness: " "There is no abdominal tenderness.   Musculoskeletal:      Cervical back: Neck supple.      Right lower leg: No edema.      Left lower leg: No edema.   Skin:     Findings: No rash.   Neurological:      General: No focal deficit present.      Mental Status: She is alert. Mental status is at baseline.      Comments: Ambulating with assistance of walker   Psychiatric:         Mood and Affect: Mood normal.         Assessment/Plan:   1. Type 2 diabetes mellitus with other specified complication, with long-term current use of insulin  -     VICTOZA 3-MAJO 0.6 mg/0.1 mL (18 mg/3 mL) PnIj pen; Inject 1.2 mg into the skin every evening.  Dispense: 3 mL; Refill: 5  -     TRESIBA FLEXTOUCH U-200 200 unit/mL (3 mL) insulin pen; Inject 56 Units into the skin once daily. 56 units every am  Dispense: 4 pen ; Refill: 5  -     ACCU-CHEK GAY PLUS TEST STRP Strp; 50 strips by Other route 2 (two) times daily. Test  Dispense: 50 strip; Refill: 12  -     glipiZIDE (GLUCOTROL) 2.5 MG TR24; Take 1 tablet (2.5 mg total) by mouth every morning.  Dispense: 90 tablet; Refill: 1  -     BD ULTRA-FINE MINI PEN NEEDLE 31 gauge x 3/16" Ndle; For use with insulin/victoza.  Dispense: 100 each; Refill: 5  -     CBC Auto Differential; Future; Expected date: 08/28/2023  -     Comprehensive Metabolic Panel; Future; Expected date: 08/28/2023  -     Lipid Panel; Future; Expected date: 08/28/2023  -     Hemoglobin A1C; Future; Expected date: 08/28/2023  -     Microalbumin/Creatinine Ratio, Urine    2. Essential hypertension  -     ATACAND HCT 32-25 mg Tab; Take 1 tablet by mouth once daily.  Dispense: 90 each; Refill: 1  -     CBC Auto Differential; Future; Expected date: 08/28/2023  -     Comprehensive Metabolic Panel; Future; Expected date: 08/28/2023  -     Lipid Panel; Future; Expected date: 08/28/2023  -     Microalbumin/Creatinine Ratio, Urine  - BP is elevated in clinic today. Patient reports normal readings at home. Will have nurse follow up with " phone call with home BP reading in 1-2 weeks. If remains elevated patient will need to RTC for medication adjustments.  - DASH diet and exercise  - Encouraged medication compliance  - Continue home blood pressure monitoring. Call/RTC for persistently elevated readings.    3. Mixed hyperlipidemia  -     rosuvastatin (CRESTOR) 10 MG tablet; Take 1 tablet (10 mg total) by mouth every evening.  Dispense: 90 tablet; Refill: 1  -     CBC Auto Differential; Future; Expected date: 08/28/2023  -     Comprehensive Metabolic Panel; Future; Expected date: 08/28/2023  -     Lipid Panel; Future; Expected date: 08/28/2023    4. Cervical spinal stenosis  The current medical regimen is effective;  continue present plan and medications.  S/p surgery with Neurosurgery (Dr. Wagoner)    5. Encounter for hepatitis C screening test for low risk patient  -     Hepatitis C Antibody; Future; Expected date: 08/28/2023    6. Other specified hearing loss of both ears  -     Ambulatory referral/consult to Audiology; Future; Expected date: 09/04/2023    7. Screening for osteoporosis  -     DXA Bone Density Axial Skeleton 1 or more sites; Future; Expected date: 08/28/2023    8. Asymptomatic menopausal state  -     DXA Bone Density Axial Skeleton 1 or more sites; Future; Expected date: 08/28/2023         Active Problem List with Overview Notes    Diagnosis Date Noted    Type 2 diabetes mellitus, with long-term current use of insulin 08/28/2023    Essential hypertension 08/28/2023    Mixed hyperlipidemia 08/28/2023    Cervical spinal stenosis 08/28/2023    Hx of colonic polyps 12/28/2022    Diverticulosis of colon 12/28/2022        Health Maintenance:  Health Maintenance   Topic Date Due    Hepatitis C Screening  Never done    Lipid Panel  Never done    TETANUS VACCINE  Never done    Mammogram  Never done    DEXA Scan  Never done    Shingles Vaccine (1 of 2) Never done    Colorectal Cancer Screening  12/28/2027   Records - screening mammogram -  Nigel  Reports she has had a prior pneumonia vaccine possible at [facility] in 3 months for chronic follow up.  RTC sooner if needed.   Patient voiced understanding and is agreeable to plan.      Kerri Mcdaniels MD    Family Medicine

## 2023-08-28 NOTE — Clinical Note
Records - screening mammogram - Nigel Reports she has had a prior pneumonia vaccine possible at [facility]

## 2023-08-29 ENCOUNTER — TELEPHONE (OUTPATIENT)
Dept: FAMILY MEDICINE | Facility: CLINIC | Age: 73
End: 2023-08-29
Payer: MEDICARE

## 2023-08-29 ENCOUNTER — OFFICE VISIT (OUTPATIENT)
Dept: OTOLARYNGOLOGY | Facility: CLINIC | Age: 73
End: 2023-08-29
Payer: MEDICARE

## 2023-08-29 ENCOUNTER — CLINICAL SUPPORT (OUTPATIENT)
Dept: AUDIOLOGY | Facility: CLINIC | Age: 73
End: 2023-08-29
Payer: MEDICARE

## 2023-08-29 VITALS — WEIGHT: 151 LBS | HEIGHT: 67 IN | BODY MASS INDEX: 23.7 KG/M2

## 2023-08-29 DIAGNOSIS — H90.3 SENSORY HEARING LOSS, BILATERAL: Primary | ICD-10-CM

## 2023-08-29 DIAGNOSIS — H90.3 SENSORINEURAL HEARING LOSS (SNHL) OF BOTH EARS: Primary | ICD-10-CM

## 2023-08-29 DIAGNOSIS — H91.8X3 OTHER SPECIFIED HEARING LOSS OF BOTH EARS: ICD-10-CM

## 2023-08-29 DIAGNOSIS — H90.3 SENSORINEURAL HEARING LOSS, BILATERAL: Primary | ICD-10-CM

## 2023-08-29 LAB
ALBUMIN SERPL BCP-MCNC: 3.8 G/DL (ref 3.5–5)
ALBUMIN/GLOB SERPL: 0.9 {RATIO}
ALP SERPL-CCNC: 113 U/L (ref 55–142)
ALT SERPL W P-5'-P-CCNC: 15 U/L (ref 13–56)
ANION GAP SERPL CALCULATED.3IONS-SCNC: 12 MMOL/L (ref 7–16)
AST SERPL W P-5'-P-CCNC: 17 U/L (ref 15–37)
BASOPHILS # BLD AUTO: 0.05 K/UL (ref 0–0.2)
BASOPHILS NFR BLD AUTO: 0.8 % (ref 0–1)
BILIRUB SERPL-MCNC: 0.6 MG/DL (ref ?–1.2)
BUN SERPL-MCNC: 25 MG/DL (ref 7–18)
BUN/CREAT SERPL: 19 (ref 6–20)
CALCIUM SERPL-MCNC: 10.7 MG/DL (ref 8.5–10.1)
CHLORIDE SERPL-SCNC: 103 MMOL/L (ref 98–107)
CHOLEST SERPL-MCNC: 116 MG/DL (ref 0–200)
CHOLEST/HDLC SERPL: 2.1 {RATIO}
CO2 SERPL-SCNC: 28 MMOL/L (ref 21–32)
CREAT SERPL-MCNC: 1.33 MG/DL (ref 0.55–1.02)
CREAT UR-MCNC: 53 MG/DL (ref 28–219)
DIFFERENTIAL METHOD BLD: ABNORMAL
EGFR (NO RACE VARIABLE) (RUSH/TITUS): 42 ML/MIN/1.73M2
EOSINOPHIL # BLD AUTO: 0.09 K/UL (ref 0–0.5)
EOSINOPHIL NFR BLD AUTO: 1.4 % (ref 1–4)
ERYTHROCYTE [DISTWIDTH] IN BLOOD BY AUTOMATED COUNT: 15.4 % (ref 11.5–14.5)
EST. AVERAGE GLUCOSE BLD GHB EST-MCNC: 157 MG/DL
GLOBULIN SER-MCNC: 4.2 G/DL (ref 2–4)
GLUCOSE SERPL-MCNC: 162 MG/DL (ref 74–106)
HBA1C MFR BLD HPLC: 7.3 % (ref 4.5–6.6)
HCT VFR BLD AUTO: 35.9 % (ref 38–47)
HCV AB SER QL: NORMAL
HDLC SERPL-MCNC: 55 MG/DL (ref 40–60)
HGB BLD-MCNC: 10.8 G/DL (ref 12–16)
IMM GRANULOCYTES # BLD AUTO: 0.03 K/UL (ref 0–0.04)
IMM GRANULOCYTES NFR BLD: 0.5 % (ref 0–0.4)
LDLC SERPL CALC-MCNC: 41 MG/DL
LYMPHOCYTES # BLD AUTO: 1.76 K/UL (ref 1–4.8)
LYMPHOCYTES NFR BLD AUTO: 26.9 % (ref 27–41)
MCH RBC QN AUTO: 25.4 PG (ref 27–31)
MCHC RBC AUTO-ENTMCNC: 30.1 G/DL (ref 32–36)
MCV RBC AUTO: 84.3 FL (ref 80–96)
MICROALBUMIN UR-MCNC: 4.5 MG/DL (ref 0–2.8)
MICROALBUMIN/CREAT RATIO PNL UR: 84.9 MG/G (ref 0–30)
MONOCYTES # BLD AUTO: 0.65 K/UL (ref 0–0.8)
MONOCYTES NFR BLD AUTO: 9.9 % (ref 2–6)
MPC BLD CALC-MCNC: 11.6 FL (ref 9.4–12.4)
NEUTROPHILS # BLD AUTO: 3.97 K/UL (ref 1.8–7.7)
NEUTROPHILS NFR BLD AUTO: 60.5 % (ref 53–65)
NONHDLC SERPL-MCNC: 61 MG/DL
NRBC # BLD AUTO: 0 X10E3/UL
NRBC, AUTO (.00): 0 %
PLATELET # BLD AUTO: 266 K/UL (ref 150–400)
POTASSIUM SERPL-SCNC: 3.7 MMOL/L (ref 3.5–5.1)
PROT SERPL-MCNC: 8 G/DL (ref 6.4–8.2)
RBC # BLD AUTO: 4.26 M/UL (ref 4.2–5.4)
SODIUM SERPL-SCNC: 139 MMOL/L (ref 136–145)
TRIGL SERPL-MCNC: 102 MG/DL (ref 35–150)
VLDLC SERPL-MCNC: 20 MG/DL
WBC # BLD AUTO: 6.55 K/UL (ref 4.5–11)

## 2023-08-29 PROCEDURE — 1160F PR REVIEW ALL MEDS BY PRESCRIBER/CLIN PHARMACIST DOCUMENTED: ICD-10-PCS | Mod: CPTII,,, | Performed by: OTOLARYNGOLOGY

## 2023-08-29 PROCEDURE — 4010F PR ACE/ARB THEARPY RXD/TAKEN: ICD-10-PCS | Mod: CPTII,,, | Performed by: OTOLARYNGOLOGY

## 2023-08-29 PROCEDURE — 99212 OFFICE O/P EST SF 10 MIN: CPT | Mod: PBBFAC,25

## 2023-08-29 PROCEDURE — 1160F RVW MEDS BY RX/DR IN RCRD: CPT | Mod: CPTII,,, | Performed by: OTOLARYNGOLOGY

## 2023-08-29 PROCEDURE — 99204 OFFICE O/P NEW MOD 45 MIN: CPT | Mod: S$PBB,,, | Performed by: OTOLARYNGOLOGY

## 2023-08-29 PROCEDURE — 99499 UNLISTED E&M SERVICE: CPT | Mod: S$PBB,,, | Performed by: OTOLARYNGOLOGY

## 2023-08-29 PROCEDURE — 1159F PR MEDICATION LIST DOCUMENTED IN MEDICAL RECORD: ICD-10-PCS | Mod: CPTII,,, | Performed by: OTOLARYNGOLOGY

## 2023-08-29 PROCEDURE — 3008F BODY MASS INDEX DOCD: CPT | Mod: CPTII,,, | Performed by: OTOLARYNGOLOGY

## 2023-08-29 PROCEDURE — 92588 EVOKED AUDITORY TST COMPLETE: ICD-10-PCS | Mod: 26,S$PBB,, | Performed by: AUDIOLOGIST

## 2023-08-29 PROCEDURE — 92588 EVOKED AUDITORY TST COMPLETE: CPT | Mod: 26,S$PBB,, | Performed by: AUDIOLOGIST

## 2023-08-29 PROCEDURE — 99212 OFFICE O/P EST SF 10 MIN: CPT | Mod: PBBFAC,25 | Performed by: AUDIOLOGIST

## 2023-08-29 PROCEDURE — 92557 COMPREHENSIVE HEARING TEST: CPT | Mod: PBBFAC | Performed by: AUDIOLOGIST

## 2023-08-29 PROCEDURE — 92588 EVOKED AUDITORY TST COMPLETE: CPT | Mod: PBBFAC | Performed by: AUDIOLOGIST

## 2023-08-29 PROCEDURE — 1159F MED LIST DOCD IN RCRD: CPT | Mod: CPTII,,, | Performed by: OTOLARYNGOLOGY

## 2023-08-29 PROCEDURE — 99213 OFFICE O/P EST LOW 20 MIN: CPT | Mod: PBBFAC,25 | Performed by: OTOLARYNGOLOGY

## 2023-08-29 PROCEDURE — 99499 NO LOS: ICD-10-PCS | Mod: S$PBB,,, | Performed by: OTOLARYNGOLOGY

## 2023-08-29 PROCEDURE — 3008F PR BODY MASS INDEX (BMI) DOCUMENTED: ICD-10-PCS | Mod: CPTII,,, | Performed by: OTOLARYNGOLOGY

## 2023-08-29 PROCEDURE — 99204 PR OFFICE/OUTPT VISIT, NEW, LEVL IV, 45-59 MIN: ICD-10-PCS | Mod: S$PBB,,, | Performed by: OTOLARYNGOLOGY

## 2023-08-29 PROCEDURE — 92567 TYMPANOMETRY: CPT | Mod: PBBFAC | Performed by: AUDIOLOGIST

## 2023-08-29 PROCEDURE — 4010F ACE/ARB THERAPY RXD/TAKEN: CPT | Mod: CPTII,,, | Performed by: OTOLARYNGOLOGY

## 2023-08-29 NOTE — PROGRESS NOTES
Consult:  Patient ref. by Dr Harley Pascual  Payer Status: private  Patient preference : Cindy Nazario L50_R P1 #1  Status: ordered No  E[place]old: No    Will contact patient upon device arrival and schedule fitting.

## 2023-08-29 NOTE — TELEPHONE ENCOUNTER
----- Message from Sushila Mcdaniels MD sent at 8/28/2023  5:31 PM CDT -----  BP elevated in clinic. Patient reports normal readings at home. Please call to record home blood pressure reading. If remains elevated or if patient has not been monitoring they will need to RTC for follow up within 2 weeks of phone call. Thanks!        7614- call made to pt. No answer.

## 2023-08-29 NOTE — TELEPHONE ENCOUNTER
----- Message from Sushila Mcdaniels MD sent at 8/28/2023  5:31 PM CDT -----  BP elevated in clinic. Patient reports normal readings at home. Please call to record home blood pressure reading. If remains elevated or if patient has not been monitoring they will need to RTC for follow up within 2 weeks of phone call. Thanks!

## 2023-08-29 NOTE — PROGRESS NOTES
Subjective:       Patient ID: Luna Wolf is a 72 y.o. female.    Chief Complaint: Hearing Loss (Hearing loss. Hearing test done prior to seeing doctor.)  States getting worse for a while   HPI  Review of Systems   HENT:  Positive for hearing loss.    All other systems reviewed and are negative.      Objective:      Physical Exam  General: NAD  Head: Normocephalic, atraumatic, no facial asymmetry/normal strength,  Ears: Both auricules normal in appearance, w/o deformities tympanic membranes normal external auditory canals normal  Nose: External nose w/o deformities normal turbinates no drainage or inflammation  Oral Cavity: Lips, gums, floor of mouth, tongue hard palate, and buccal mucosa without mass/lesion  Oropharynx: Mucosa pink and moist, soft palate, posterior pharynx and oropharyngeal wall without mass/lesion  Neck: Supple, symmetric, trachea midline, no palpable mass/lesion, no palpable cervical lymphadenopathy  Skin: Warm and dry, no concerning lesions  Respiratory: Respirations even, unlabored   Assessment:       1. Sensorineural hearing loss (SNHL) of both ears        Plan:       Reviewed and discussed audio in great detail and rec hearing aids

## 2023-08-30 ENCOUNTER — PATIENT OUTREACH (OUTPATIENT)
Dept: ADMINISTRATIVE | Facility: HOSPITAL | Age: 73
End: 2023-08-30

## 2023-08-30 NOTE — LETTER
AUTHORIZATION FOR RELEASE OF   CONFIDENTIAL INFORMATION    Dear [facility],    We are seeing Luna Wolf, date of birth 1950, in the clinic at No Department Specified. Sydni Herman NP is the patient's PCP. Luna Wolf has an outstanding lab/procedure at the time we reviewed her chart. In order to help keep her health information updated, she has authorized us to request the following medical record(s):        (  )  MAMMOGRAM                                      (  )  COLONOSCOPY      (  )  PAP SMEAR                                          (  )  OUTSIDE LAB RESULTS     (  )  DEXA SCAN                                          (  )  EYE EXAM            (  )  FOOT EXAM                                          (  )  ENTIRE RECORD     ( x )  OUTSIDE IMMUNIZATIONS                 ( x )  pneumonia vaccine info         Please fax records to [facility], [place], [place], NP, [phone].     If you have any questions, please contact Lizz Barber at .           Patient Name: Luna Wolf  : 1950  Patient Phone #: [phone]

## 2023-08-30 NOTE — LETTER
AUTHORIZATION FOR RELEASE OF   CONFIDENTIAL INFORMATION    Dear [facility],    We are seeing Luna Wolf, date of birth 1950, in the clinic at No Department Specified. Sydni Herman NP is the patient's PCP. Luna Wolf has an outstanding lab/procedure at the time we reviewed her chart. In order to help keep her health information updated, she has authorized us to request the following medical record(s):        ( x )  MAMMOGRAM                                      (  )  COLONOSCOPY      (  )  PAP SMEAR                                          (  )  OUTSIDE LAB RESULTS     (  )  DEXA SCAN                                          (  )  EYE EXAM            (  )  FOOT EXAM                                          (  )  ENTIRE RECORD     (  )  OUTSIDE IMMUNIZATIONS                 (  )  _______________         Please fax records to [facility], [place], [place], NP, [phone].     If you have any questions, please contact Lizz Barber at .           Patient Name: Luna Wolf  : 1950  Patient Phone #: [phone]

## 2023-08-30 NOTE — PROGRESS NOTES
[facility]I faxed to [facility] for mammo and [facility] for pneumonia vaccine info.     No pneumonia vaccine information in [facility].

## 2023-09-11 ENCOUNTER — DOCUMENT SCAN (OUTPATIENT)
Dept: HOME HEALTH SERVICES | Facility: HOSPITAL | Age: 73
End: 2023-09-11
Payer: MEDICARE

## 2023-10-04 ENCOUNTER — TELEPHONE (OUTPATIENT)
Dept: FAMILY MEDICINE | Facility: CLINIC | Age: 73
End: 2023-10-04
Payer: MEDICARE

## 2023-10-04 VITALS — DIASTOLIC BLOOD PRESSURE: 80 MMHG | SYSTOLIC BLOOD PRESSURE: 132 MMHG

## 2023-10-04 NOTE — TELEPHONE ENCOUNTER
----- Message from Sushila Mcdaniels MD sent at 10/4/2023 11:56 AM CDT -----  Please call patient regarding lab results. Patient is mildly anemic. This may be her baseline. She had also recently had surgery when this lab was drawn. I do not have a prior value available for comparison. HbA1C was 7.3. DM is fairly well controlled. Patient did report she was not taking her regular home medications as prescribed while she was previously hospitalized for surgery and rehab. She reported in the office her sugar and BP levels were improving since she had been home and had resumed her routine medication regimen. Creatinine is mildly elevated. I do not have a baseline available for comparison. Recommend avoiding NSAIDs and drinking an adequate amount of water. Cholesterol is good. Hepatitis C screening is negative/normal. Repeat labs at follow up. Thanks!      1531- call made to pt regarding above message. Pt voiced understanding and states blood sugars and bp at home have been good. States blood sugars run between 80s-90s a few in the low 100s. States bp yesterday was 132/80.

## 2023-11-08 ENCOUNTER — PATIENT OUTREACH (OUTPATIENT)
Dept: ADMINISTRATIVE | Facility: HOSPITAL | Age: 73
End: 2023-11-08

## 2023-11-08 NOTE — LETTER
AUTHORIZATION FOR RELEASE OF   CONFIDENTIAL INFORMATION    Dear [facility],    We are seeing Luna Wolf, date of birth 1950, in the clinic at No Department Specified. Sydni Herman NP is the patient's PCP. Luna Wolf has an outstanding lab/procedure at the time we reviewed her chart. In order to help keep her health information updated, she has authorized us to request the following medical record(s):        (  )  MAMMOGRAM                                      (  )  COLONOSCOPY      (  )  PAP SMEAR                                          (  )  OUTSIDE LAB RESULTS     (  )  DEXA SCAN                                          (  )  EYE EXAM            (  )  FOOT EXAM                                          (  )  ENTIRE RECORD     ( x )  OUTSIDE IMMUNIZATIONS                 (  )  _______________         Please fax records to [facility], [place], [place], NP, [phone]     If you have any questions, please contact Bartolo Schumacher at [phone].           Patient Name: Luna Wolf  : 1950  Patient Phone #: [phone]

## 2023-11-08 NOTE — PROGRESS NOTES
Health maintenance record review for population health care gaps    Population Health Chart Review & Patient Outreach Details      Further Action Needed If Patient Returns Outreach:     [facility] for vaccines  Mai Maher for recent mammogram    Updates Requested / Reviewed:     []  Care Everywhere    []     []  External Sources ([facility], [facility], [facility], etc.)    [] [facility]   [] [facility]   [] Other:    [x]  Care Team Updated   []  Removed  or Duplicate Orders   []  Immunization Reconciliation Completed / Queried    [] [place]   [] [place]   [] [place]   [] [place]      Health Maintenance Topics Addressed and Outreach Outcomes / Actions Taken:             Breast Cancer Screening []  Mammogram Order Placed    []  Mammogram Screening Scheduled    [x]  External Records Requested & Care Team Updated if Applicable    [x]  External Records Uploaded & Care Team Updated if Applicable    []  Pt Declined Scheduling Mammogram    []  Pt Will Schedule with External Provider / Order Routed & Care Team Updated if Applicable              Cervical Cancer Screening []  Pap Smear Scheduled in Primary Care or OBGYN    []  External Records Requested & Care Team Updated if Applicable       []  External Records Uploaded, Care Team Updated, & History Updated if Applicable    []  Patient Declined Scheduling Pap Smear    []  Patient Will Schedule with External Provider & Care Team Updated if Applicable                  Colorectal Cancer Screening []  Colonoscopy Case Request / Referral / Home Test Order Placed    []  External Records Requested & Care Team Updated if Applicable    []  External Records Uploaded, Care Team Updated, & History Updated if Applicable    []  Patient Declined Completing Colon Cancer Screening    []  Patient Will Schedule with External Provider & Care Team Updated if Applicable    []  Fit Kit Mailed (add the SmartPhrase under additional notes)    []  Reminded Patient to Complete Home  Test                Diabetic Eye Exam []  Eye Exam Screening Order Placed    []  Eye Camera Scheduled or Optometry/Ophthalmology Referral Placed    []  External Records Requested & Care Team Updated if Applicable    []  External Records Uploaded, Care Team Updated, & History Updated if Applicable    []  Patient Declined Scheduling Eye Exam    []  Patient Will Schedule with External Provider & Care Team Updated if Applicable             Blood Pressure Control []  Primary Care Follow Up Visit Scheduled     []  Remote Blood Pressure Reading Captured    []  Patient Declined Remote Reading or Scheduling Appt - Escalated to PCP    []  Patient Will Call Back or Send Portal Message with Reading                 HbA1c & Other Labs []  Overdue Lab(s) Ordered    []  Overdue Lab(s) Scheduled    []  External Records Uploaded & Care Team Updated if Applicable    []  Primary Care Follow Up Visit Scheduled     []  Reminded Patient to Complete A1c Home Test    []  Patient Declined Scheduling Labs or Will Call Back to Schedule    []  Patient Will Schedule with External Provider / Order Routed, & Care Team Updated if Applicable           Primary Care Appointment []  Primary Care Appt Scheduled    []  Patient Declined Scheduling or Will Call Back to Schedule    []  Pt Established with External Provider, Updated Care Team, & Informed Pt to Notify Payor if Applicable           Medication Adherence /    Statin Use []  Primary Care Appointment Scheduled    []  Patient Reminded to  Prescription    []  Patient Declined, Provider Notified if Needed    []  Sent Provider Message to Review to Evaluate Pt for Statin, Add Exclusion Dx Codes, Document   Exclusion in Problem List, Change Statin Intensity Level to Moderate or High Intensity if Applicable                Osteoporosis Screening []  Dexa Order Placed    []  Dexa Appointment Scheduled    []  External Records Requested & Care Team Updated    []  External Records Uploaded, Care Team  Updated, & History Updated if Applicable    []  Patient Declined Scheduling Dexa or Will Call Back to Schedule    []  Patient Will Schedule with External Provider / Order Routed & Care Team Updated if Applicable       Additional Notes:

## 2023-11-08 NOTE — LETTER
AUTHORIZATION FOR RELEASE OF   CONFIDENTIAL INFORMATION    Dear [facility],    We are seeing Luna Wolf, date of birth 1950, in the clinic at No Department Specified. Sydni Herman NP is the patient's PCP. Luna Wolf has an outstanding lab/procedure at the time we reviewed her chart. In order to help keep her health information updated, she has authorized us to request the following medical record(s):        ( x )  MAMMOGRAM                                      (  )  COLONOSCOPY      (  )  PAP SMEAR                                          (  )  OUTSIDE LAB RESULTS     (  )  DEXA SCAN                                          (  )  EYE EXAM            (  )  FOOT EXAM                                          (  )  ENTIRE RECORD     (  )  OUTSIDE IMMUNIZATIONS                 (  )  _______________         Please fax records to [facility], [place], [place], NP, [phone]     If you have any questions, please contact makayla Schumacher at [phone].           Patient Name: Luna Wolf  : 1950  Patient Phone #: [phone]

## 2023-11-15 ENCOUNTER — EXTERNAL HOME HEALTH (OUTPATIENT)
Dept: HOME HEALTH SERVICES | Facility: HOSPITAL | Age: 73
End: 2023-11-15
Payer: MEDICARE

## 2023-12-20 ENCOUNTER — DOCUMENT SCAN (OUTPATIENT)
Dept: HOME HEALTH SERVICES | Facility: HOSPITAL | Age: 73
End: 2023-12-20
Payer: MEDICARE

## 2024-01-05 ENCOUNTER — DOCUMENT SCAN (OUTPATIENT)
Dept: HOME HEALTH SERVICES | Facility: HOSPITAL | Age: 74
End: 2024-01-05
Payer: MEDICARE

## 2024-02-28 ENCOUNTER — OFFICE VISIT (OUTPATIENT)
Dept: FAMILY MEDICINE | Facility: CLINIC | Age: 74
End: 2024-02-28
Payer: MEDICARE

## 2024-02-28 VITALS
DIASTOLIC BLOOD PRESSURE: 85 MMHG | HEIGHT: 67 IN | BODY MASS INDEX: 25.11 KG/M2 | SYSTOLIC BLOOD PRESSURE: 156 MMHG | RESPIRATION RATE: 18 BRPM | TEMPERATURE: 98 F | WEIGHT: 160 LBS | HEART RATE: 94 BPM | OXYGEN SATURATION: 96 %

## 2024-02-28 DIAGNOSIS — I10 ESSENTIAL HYPERTENSION: ICD-10-CM

## 2024-02-28 DIAGNOSIS — E11.69 TYPE 2 DIABETES MELLITUS WITH OTHER SPECIFIED COMPLICATION, WITH LONG-TERM CURRENT USE OF INSULIN: Primary | ICD-10-CM

## 2024-02-28 DIAGNOSIS — E78.2 MIXED HYPERLIPIDEMIA: ICD-10-CM

## 2024-02-28 DIAGNOSIS — J30.2 SEASONAL ALLERGIES: ICD-10-CM

## 2024-02-28 DIAGNOSIS — Z79.4 TYPE 2 DIABETES MELLITUS WITH OTHER SPECIFIED COMPLICATION, WITH LONG-TERM CURRENT USE OF INSULIN: Primary | ICD-10-CM

## 2024-02-28 DIAGNOSIS — N18.32 STAGE 3B CHRONIC KIDNEY DISEASE: ICD-10-CM

## 2024-02-28 PROCEDURE — 80061 LIPID PANEL: CPT | Mod: ,,, | Performed by: CLINICAL MEDICAL LABORATORY

## 2024-02-28 PROCEDURE — 85025 COMPLETE CBC W/AUTO DIFF WBC: CPT | Mod: ,,, | Performed by: CLINICAL MEDICAL LABORATORY

## 2024-02-28 PROCEDURE — 82043 UR ALBUMIN QUANTITATIVE: CPT | Mod: ,,, | Performed by: CLINICAL MEDICAL LABORATORY

## 2024-02-28 PROCEDURE — 1159F MED LIST DOCD IN RCRD: CPT | Mod: ,,, | Performed by: FAMILY MEDICINE

## 2024-02-28 PROCEDURE — 1126F AMNT PAIN NOTED NONE PRSNT: CPT | Mod: ,,, | Performed by: FAMILY MEDICINE

## 2024-02-28 PROCEDURE — 3052F HG A1C>EQUAL 8.0%<EQUAL 9.0%: CPT | Mod: ,,, | Performed by: FAMILY MEDICINE

## 2024-02-28 PROCEDURE — 99214 OFFICE O/P EST MOD 30 MIN: CPT | Mod: ,,, | Performed by: FAMILY MEDICINE

## 2024-02-28 PROCEDURE — 3288F FALL RISK ASSESSMENT DOCD: CPT | Mod: ,,, | Performed by: FAMILY MEDICINE

## 2024-02-28 PROCEDURE — 3079F DIAST BP 80-89 MM HG: CPT | Mod: ,,, | Performed by: FAMILY MEDICINE

## 2024-02-28 PROCEDURE — 82570 ASSAY OF URINE CREATININE: CPT | Mod: ,,, | Performed by: CLINICAL MEDICAL LABORATORY

## 2024-02-28 PROCEDURE — 80053 COMPREHEN METABOLIC PANEL: CPT | Mod: ,,, | Performed by: CLINICAL MEDICAL LABORATORY

## 2024-02-28 PROCEDURE — 83036 HEMOGLOBIN GLYCOSYLATED A1C: CPT | Mod: ,,, | Performed by: CLINICAL MEDICAL LABORATORY

## 2024-02-28 PROCEDURE — 3077F SYST BP >= 140 MM HG: CPT | Mod: ,,, | Performed by: FAMILY MEDICINE

## 2024-02-28 PROCEDURE — 1101F PT FALLS ASSESS-DOCD LE1/YR: CPT | Mod: ,,, | Performed by: FAMILY MEDICINE

## 2024-02-28 RX ORDER — INSULIN DEGLUDEC 200 U/ML
56 INJECTION, SOLUTION SUBCUTANEOUS DAILY
Qty: 9 PEN | Refills: 1 | Status: SHIPPED | OUTPATIENT
Start: 2024-02-28

## 2024-02-28 RX ORDER — MONTELUKAST SODIUM 10 MG/1
10 TABLET ORAL DAILY
Qty: 90 TABLET | Refills: 1 | Status: SHIPPED | OUTPATIENT
Start: 2024-02-28

## 2024-02-28 RX ORDER — CANDESARTAN CILEXETIL AND HYDROCHLOROTHIAZIDE 32; 25 MG/1; MG/1
1 TABLET ORAL DAILY
Qty: 90 EACH | Refills: 1 | Status: SHIPPED | OUTPATIENT
Start: 2024-02-28

## 2024-02-28 RX ORDER — INSULIN PUMP SYRINGE, 3 ML
EACH MISCELLANEOUS
Qty: 1 EACH | Refills: 0 | Status: SHIPPED | OUTPATIENT
Start: 2024-02-28 | End: 2025-02-27

## 2024-02-28 RX ORDER — GLIPIZIDE 2.5 MG/1
2.5 TABLET, EXTENDED RELEASE ORAL EVERY MORNING
Qty: 90 TABLET | Refills: 1 | Status: SHIPPED | OUTPATIENT
Start: 2024-02-28

## 2024-02-28 RX ORDER — ROSUVASTATIN CALCIUM 10 MG/1
10 TABLET, COATED ORAL NIGHTLY
Qty: 90 TABLET | Refills: 1 | Status: SHIPPED | OUTPATIENT
Start: 2024-02-28

## 2024-02-28 NOTE — PROGRESS NOTES
"Clinic Note    Patient Name: Luna Wolf  : 1950  MRN: [id number]    Chief Complaint   Patient presents with    Follow-up     6 month    Diabetes     States blood glucose machine is messing up and has called  and still not working properly. Also, wants to know if she can go to " once a week shot."        HPI:    Ms. Luna Wolf is a 73 y.o. female who presents to clinic today with CC of follow up on chronic disease processes including Type II DM, HTN, HLD, and cervical spinal stenosis.   BP is elevated today. Reports h/o "White coat HTN". Reports she monitors her blood pressure at home with normal readings.   Patient reports chronic issues are well controlled on current medication regimen.  Denies problems or side effects with medications.  Patient is, otherwise, without complaints.     Medications:  Medication List with Changes/Refills   New Medications    BLOOD SUGAR DIAGNOSTIC STRP    For 3 times daily home blood glucose monitoring Dx: Type II DM with hyperglycemia Insulin Dependent E11.65    BLOOD-GLUCOSE METER KIT    For 3 times daily home blood glucose monitoring Dx: Type II DM with hyperglycemia Insulin Dependent E11.65    SEMAGLUTIDE (OZEMPIC) 0.25 MG OR 0.5 MG (2 MG/3 ML) PEN INJECTOR    Inject 0.25 mg SC weekly X 1 month, then increase to 0.5 mg SC weekly.   Current Medications    ACCU-CHEK GAY PLUS TEST STRP STRP    50 strips by Other route 2 (two) times daily. Test    ASPIRIN (ECOTRIN) 81 MG EC TABLET    Take 81 mg by mouth once daily.    BD ULTRA-FINE MINI PEN NEEDLE 31 GAUGE X 3/16" NDLE    For use with insulin/victoza.   Changed and/or Refilled Medications    Modified Medication Previous Medication    ATACAND HCT 32-25 MG TAB ATACAND HCT 32-25 mg Tab       Take 1 tablet by mouth once daily.    Take 1 tablet by mouth once daily.    GLIPIZIDE (GLUCOTROL) 2.5 MG TR24 glipiZIDE (GLUCOTROL) 2.5 MG TR24       Take 1 tablet (2.5 mg total) by mouth every morning.    Take 1 tablet (2.5 mg " total) by mouth every morning.    MONTELUKAST (SINGULAIR) 10 MG TABLET montelukast (SINGULAIR) 10 mg tablet       Take 1 tablet (10 mg total) by mouth once daily.    Take 10 mg by mouth.    ROSUVASTATIN (CRESTOR) 10 MG TABLET rosuvastatin (CRESTOR) 10 MG tablet       Take 1 tablet (10 mg total) by mouth every evening.    Take 1 tablet (10 mg total) by mouth every evening.    TRESIBA FLEXTOUCH U-200 200 UNIT/ML (3 ML) INSULIN PEN TRESIBA FLEXTOUCH U-200 200 unit/mL (3 mL) insulin pen       Inject 56 Units into the skin once daily. 56 units every am    Inject 56 Units into the skin once daily. 56 units every am   Discontinued Medications    FLUTICASONE PROPIONATE (FLONASE) 50 MCG/ACTUATION NASAL SPRAY    by Each Nostril route.    VICTOZA 3-MAJO 0.6 MG/0.1 ML (18 MG/3 ML) PNIJ PEN    Inject 1.2 mg into the skin every evening.        Allergies: Ibuprofen      Past Medical History:    Past Medical History:   Diagnosis Date    Breast cancer in female     Cancer     Diabetes mellitus     Hypertension     Mixed hyperlipidemia        Past Surgical History:    Past Surgical History:   Procedure Laterality Date    BREAST BIOPSY      TUBAL LIGATION           Social History:    Social History     Tobacco Use   Smoking Status Never   Smokeless Tobacco Never     Social History     Substance and Sexual Activity   Alcohol Use Never     Social History     Substance and Sexual Activity   Drug Use Never         Family History:    Family History   Problem Relation Age of Onset    Diabetes Maternal Grandfather        Review of Systems:    Review of Systems   Constitutional:  Negative for appetite change, chills, fatigue, fever and unexpected weight change.   Eyes:  Negative for visual disturbance.   Respiratory:  Negative for cough and shortness of breath.    Cardiovascular:  Negative for chest pain and leg swelling.   Gastrointestinal:  Negative for abdominal pain, change in bowel habit, constipation, diarrhea, nausea and vomiting.  "  Musculoskeletal:  Negative for arthralgias.   Integumentary:  Negative for rash.   Neurological:  Negative for dizziness and headaches.   Psychiatric/Behavioral:  The patient is not nervous/anxious.         Vitals:    Vitals:    02/28/24 1333 02/28/24 1427   BP: (!) 187/90 (!) 156/85   BP Location: Left arm Left arm   Patient Position: Sitting Sitting   BP Method: Medium (Automatic) Large (Automatic)   Pulse: 94    Resp: 18    Temp: 98.4 °F (36.9 °C)    TempSrc: Oral    SpO2: 96%    Weight: 72.6 kg (160 lb)    Height: 5' 7" (1.702 m)        Body mass index is 25.06 kg/m².    Wt Readings from Last 3 Encounters:   02/28/24 1333 72.6 kg (160 lb)   08/29/23 1430 68.5 kg (151 lb)   08/28/23 1422 68.6 kg (151 lb 3.2 oz)        Physical Exam:    Physical Exam  Constitutional:       General: She is not in acute distress.     Appearance: Normal appearance.   HENT:      Nose: Nose normal.      Mouth/Throat:      Mouth: Mucous membranes are moist.      Pharynx: Oropharynx is clear.   Eyes:      Conjunctiva/sclera: Conjunctivae normal.   Cardiovascular:      Rate and Rhythm: Normal rate and regular rhythm.      Heart sounds: Normal heart sounds. No murmur heard.  Pulmonary:      Effort: Pulmonary effort is normal. No respiratory distress.      Breath sounds: Normal breath sounds. No wheezing, rhonchi or rales.   Abdominal:      General: Bowel sounds are normal.      Palpations: Abdomen is soft.      Tenderness: There is no abdominal tenderness.   Musculoskeletal:      Cervical back: Neck supple.      Right lower leg: No edema.      Left lower leg: No edema.   Skin:     Findings: No rash.   Neurological:      General: No focal deficit present.      Mental Status: She is alert. Mental status is at baseline.   Psychiatric:         Mood and Affect: Mood normal.           Assessment/Plan:   1. Type 2 diabetes mellitus with other specified complication, with long-term current use of insulin  -     glipiZIDE (GLUCOTROL) 2.5 MG TR24; " Take 1 tablet (2.5 mg total) by mouth every morning.  Dispense: 90 tablet; Refill: 1  -     TRESIBA FLEXTOUCH U-200 200 unit/mL (3 mL) insulin pen; Inject 56 Units into the skin once daily. 56 units every am  Dispense: 9 pen ; Refill: 1  -     semaglutide (OZEMPIC) 0.25 mg or 0.5 mg (2 mg/3 mL) pen injector; Inject 0.25 mg SC weekly X 1 month, then increase to 0.5 mg SC weekly.  Dispense: 3 each; Refill: 1- new medication. Risks/benefits/potential side effects/black box warning reviewed and discussed with patient. Patient requested to change from Trulicity to Ozempic.  -     blood-glucose meter kit; For 3 times daily home blood glucose monitoring Dx: Type II DM with hyperglycemia Insulin Dependent E11.65  Dispense: 1 each; Refill: 0  -     blood sugar diagnostic Strp; For 3 times daily home blood glucose monitoring Dx: Type II DM with hyperglycemia Insulin Dependent E11.65  Dispense: 270 strip; Refill: 3  -     CBC Auto Differential; Future; Expected date: 02/28/2024  -     Comprehensive Metabolic Panel; Future; Expected date: 02/28/2024  -     Lipid Panel; Future; Expected date: 02/28/2024  -     Hemoglobin A1C; Future; Expected date: 02/28/2024  -     Microalbumin/Creatinine Ratio, Urine    2. Essential hypertension  -     ATACAND HCT 32-25 mg Tab; Take 1 tablet by mouth once daily.  Dispense: 90 each; Refill: 1  -     CBC Auto Differential; Future; Expected date: 02/28/2024  -     Comprehensive Metabolic Panel; Future; Expected date: 02/28/2024  -     Lipid Panel; Future; Expected date: 02/28/2024  -     Microalbumin/Creatinine Ratio, Urine    3. Mixed hyperlipidemia  -     rosuvastatin (CRESTOR) 10 MG tablet; Take 1 tablet (10 mg total) by mouth every evening.  Dispense: 90 tablet; Refill: 1  -     CBC Auto Differential; Future; Expected date: 02/28/2024  -     Comprehensive Metabolic Panel; Future; Expected date: 02/28/2024  -     Lipid Panel; Future; Expected date: 02/28/2024    4. Seasonal allergies  -      montelukast (SINGULAIR) 10 mg tablet; Take 1 tablet (10 mg total) by mouth once daily.  Dispense: 90 tablet; Refill: 1    5. Stage 3b chronic kidney disease  The current medical regimen is effective;  continue present plan and medications.  - Avoid NSAIDs  - Drink an adequate amount of water       Active Problem List with Overview Notes    Diagnosis Date Noted    Stage 3b chronic kidney disease 03/01/2024    Type 2 diabetes mellitus, with long-term current use of insulin 08/28/2023    Essential hypertension 08/28/2023    Mixed hyperlipidemia 08/28/2023    Cervical spinal stenosis 08/28/2023     S/p surgery with Dr. Wagoner (Neurosugery) in [place], [place]      Hx of colonic polyps 12/28/2022    Diverticulosis of colon 12/28/2022        Health Maintenance:  Health Maintenance   Topic Date Due    Foot Exam  Never done    Eye Exam  Never done    TETANUS VACCINE  Never done    DEXA Scan  Never done    Shingles Vaccine (1 of 2) Never done    Mammogram  12/01/2023    Hemoglobin A1c  05/28/2024    Low Dose Statin  02/28/2025    Lipid Panel  02/28/2025    Colorectal Cancer Screening  12/28/2027    Hepatitis C Screening  Completed   Records: Mammogram from [facility]    RTC in 6 months for chronic follow up.  RTC sooner if needed.   Patient voiced understanding and is agreeable to plan.      Kerri Mcdaniels MD    Family Medicine

## 2024-02-29 LAB
BASOPHILS # BLD AUTO: 0.05 K/UL (ref 0–0.2)
BASOPHILS NFR BLD AUTO: 0.7 % (ref 0–1)
DIFFERENTIAL METHOD BLD: ABNORMAL
EOSINOPHIL # BLD AUTO: 0.22 K/UL (ref 0–0.5)
EOSINOPHIL NFR BLD AUTO: 3.1 % (ref 1–4)
ERYTHROCYTE [DISTWIDTH] IN BLOOD BY AUTOMATED COUNT: 14.5 % (ref 11.5–14.5)
HCT VFR BLD AUTO: 33.7 % (ref 38–47)
HGB BLD-MCNC: 10.8 G/DL (ref 12–16)
IMM GRANULOCYTES # BLD AUTO: 0.04 K/UL (ref 0–0.04)
IMM GRANULOCYTES NFR BLD: 0.6 % (ref 0–0.4)
LYMPHOCYTES # BLD AUTO: 1.87 K/UL (ref 1–4.8)
LYMPHOCYTES NFR BLD AUTO: 26.1 % (ref 27–41)
MCH RBC QN AUTO: 27.3 PG (ref 27–31)
MCHC RBC AUTO-ENTMCNC: 32 G/DL (ref 32–36)
MCV RBC AUTO: 85.1 FL (ref 80–96)
MONOCYTES # BLD AUTO: 0.78 K/UL (ref 0–0.8)
MONOCYTES NFR BLD AUTO: 10.9 % (ref 2–6)
MPC BLD CALC-MCNC: 11.2 FL (ref 9.4–12.4)
NEUTROPHILS # BLD AUTO: 4.2 K/UL (ref 1.8–7.7)
NEUTROPHILS NFR BLD AUTO: 58.6 % (ref 53–65)
NRBC # BLD AUTO: 0 X10E3/UL
NRBC, AUTO (.00): 0 %
PLATELET # BLD AUTO: 203 K/UL (ref 150–400)
RBC # BLD AUTO: 3.96 M/UL (ref 4.2–5.4)
WBC # BLD AUTO: 7.16 K/UL (ref 4.5–11)

## 2024-03-01 ENCOUNTER — PATIENT OUTREACH (OUTPATIENT)
Dept: ADMINISTRATIVE | Facility: HOSPITAL | Age: 74
End: 2024-03-01

## 2024-03-01 PROBLEM — N18.32 STAGE 3B CHRONIC KIDNEY DISEASE: Status: ACTIVE | Noted: 2024-03-01

## 2024-03-01 LAB
ALBUMIN SERPL BCP-MCNC: 3.8 G/DL (ref 3.5–5)
ALBUMIN/GLOB SERPL: 1.1 {RATIO}
ALP SERPL-CCNC: 150 U/L (ref 55–142)
ALT SERPL W P-5'-P-CCNC: 35 U/L (ref 13–56)
ANION GAP SERPL CALCULATED.3IONS-SCNC: 9 MMOL/L (ref 7–16)
AST SERPL W P-5'-P-CCNC: 24 U/L (ref 15–37)
BILIRUB SERPL-MCNC: 0.5 MG/DL (ref ?–1.2)
BUN SERPL-MCNC: 34 MG/DL (ref 7–18)
BUN/CREAT SERPL: 23 (ref 6–20)
CALCIUM SERPL-MCNC: 10.1 MG/DL (ref 8.5–10.1)
CHLORIDE SERPL-SCNC: 107 MMOL/L (ref 98–107)
CHOLEST SERPL-MCNC: 111 MG/DL (ref 0–200)
CHOLEST/HDLC SERPL: 1.9 {RATIO}
CO2 SERPL-SCNC: 27 MMOL/L (ref 21–32)
CREAT SERPL-MCNC: 1.46 MG/DL (ref 0.55–1.02)
CREAT UR-MCNC: 130 MG/DL (ref 28–219)
EGFR (NO RACE VARIABLE) (RUSH/TITUS): 37 ML/MIN/1.73M2
EST. AVERAGE GLUCOSE BLD GHB EST-MCNC: 189 MG/DL
GLOBULIN SER-MCNC: 3.6 G/DL (ref 2–4)
GLUCOSE SERPL-MCNC: 189 MG/DL (ref 74–106)
HBA1C MFR BLD HPLC: 8.2 % (ref 4.5–6.6)
HDLC SERPL-MCNC: 58 MG/DL (ref 40–60)
LDLC SERPL CALC-MCNC: 36 MG/DL
MICROALBUMIN UR-MCNC: 12.9 MG/DL (ref 0–2.8)
MICROALBUMIN/CREAT RATIO PNL UR: 99.2 MG/G (ref 0–30)
NONHDLC SERPL-MCNC: 53 MG/DL
POTASSIUM SERPL-SCNC: 4.3 MMOL/L (ref 3.5–5.1)
PROT SERPL-MCNC: 7.4 G/DL (ref 6.4–8.2)
SODIUM SERPL-SCNC: 139 MMOL/L (ref 136–145)
TRIGL SERPL-MCNC: 85 MG/DL (ref 35–150)
VLDLC SERPL-MCNC: 17 MG/DL

## 2024-03-01 NOTE — PROGRESS NOTES
Health maintenance record review for population health care gaps    Population Health Chart Review & Patient Outreach Details      Further Action Needed If Patient Returns Outreach:            Updates Requested / Reviewed:     [x]  Care Everywhere    [x]     []  External Sources ([facility], [facility], DIS, etc.)    [] [facility]   [] [facility]   [] Other:    [x]  Care Team Updated   []  Removed  or Duplicate Orders   []  Immunization Reconciliation Completed / Queried    [] [place]   [] [place]   [] [place]   [] [place]      Health Maintenance Topics Addressed and Outreach Outcomes / Actions Taken:             Breast Cancer Screening []  Mammogram Order Placed    []  Mammogram Screening Scheduled    [x]  External Records Requested & Care Team Updated if Applicable    []  External Records Uploaded & Care Team Updated if Applicable    []  Pt Declined Scheduling Mammogram    []  Pt Will Schedule with External Provider / Order Routed & Care Team Updated if Applicable              Cervical Cancer Screening []  Pap Smear Scheduled in Primary Care or OBGYN    []  External Records Requested & Care Team Updated if Applicable       []  External Records Uploaded, Care Team Updated, & History Updated if Applicable    []  Patient Declined Scheduling Pap Smear    []  Patient Will Schedule with External Provider & Care Team Updated if Applicable                  Colorectal Cancer Screening []  Colonoscopy Case Request / Referral / Home Test Order Placed    []  External Records Requested & Care Team Updated if Applicable    []  External Records Uploaded, Care Team Updated, & History Updated if Applicable    []  Patient Declined Completing Colon Cancer Screening    []  Patient Will Schedule with External Provider & Care Team Updated if Applicable    []  Fit Kit Mailed (add the SmartPhrase under additional notes)    []  Reminded Patient to Complete Home Test                Diabetic Eye Exam []  Eye Exam Screening Order  Placed    []  Eye Camera Scheduled or Optometry/Ophthalmology Referral Placed    []  External Records Requested & Care Team Updated if Applicable    []  External Records Uploaded, Care Team Updated, & History Updated if Applicable    []  Patient Declined Scheduling Eye Exam    []  Patient Will Schedule with External Provider & Care Team Updated if Applicable             Blood Pressure Control []  Primary Care Follow Up Visit Scheduled     []  Remote Blood Pressure Reading Captured    []  Patient Declined Remote Reading or Scheduling Appt - Escalated to PCP    []  Patient Will Call Back or Send Portal Message with Reading                 HbA1c & Other Labs []  Overdue Lab(s) Ordered    []  Overdue Lab(s) Scheduled    []  External Records Uploaded & Care Team Updated if Applicable    []  Primary Care Follow Up Visit Scheduled     []  Reminded Patient to Complete A1c Home Test    []  Patient Declined Scheduling Labs or Will Call Back to Schedule    []  Patient Will Schedule with External Provider / Order Routed, & Care Team Updated if Applicable           Primary Care Appointment []  Primary Care Appt Scheduled    []  Patient Declined Scheduling or Will Call Back to Schedule    []  Pt Established with External Provider, Updated Care Team, & Informed Pt to Notify Payor if Applicable           Medication Adherence /    Statin Use []  Primary Care Appointment Scheduled    []  Patient Reminded to  Prescription    []  Patient Declined, Provider Notified if Needed    []  Sent Provider Message to Review to Evaluate Pt for Statin, Add Exclusion Dx Codes, Document   Exclusion in Problem List, Change Statin Intensity Level to Moderate or High Intensity if Applicable                Osteoporosis Screening []  Dexa Order Placed    []  Dexa Appointment Scheduled    []  External Records Requested & Care Team Updated    []  External Records Uploaded, Care Team Updated, & History Updated if Applicable    []  Patient Declined  Scheduling Dexa or Will Call Back to Schedule    []  Patient Will Schedule with External Provider / Order Routed & Care Team Updated if Applicable       Additional Notes:

## 2024-03-01 NOTE — LETTER
AUTHORIZATION FOR RELEASE OF   CONFIDENTIAL INFORMATION    Dear [facility],    We are seeing Luna Wolf, date of birth 1950, in the clinic at No Department Specified. Sydni Herman NP is the patient's PCP. Luna Wolf has an outstanding lab/procedure at the time we reviewed her chart. In order to help keep her health information updated, she has authorized us to request the following medical record(s):        ( x )  MAMMOGRAM                                      (  )  COLONOSCOPY      (  )  PAP SMEAR                                          (  )  OUTSIDE LAB RESULTS     (  )  DEXA SCAN                                          (  )  EYE EXAM            (  )  FOOT EXAM                                          (  )  ENTIRE RECORD     (  )  OUTSIDE IMMUNIZATIONS                 (  )  _______________         Please fax records to [facility], [place], [place], NP, [phone]     If you have any questions, please contact [facility] [phone].           Patient Name: Luna Wolf  : 1950  Patient Phone #: [phone]

## 2024-03-06 ENCOUNTER — TELEPHONE (OUTPATIENT)
Dept: FAMILY MEDICINE | Facility: CLINIC | Age: 74
End: 2024-03-06
Payer: MEDICARE

## 2024-03-06 NOTE — TELEPHONE ENCOUNTER
----- Message from Sushila Mcdaniels MD sent at 3/1/2024 12:37 PM CST -----  Please call patient regarding lab results. HbA1C is increased from 7.3 to 8.2. however, she recently requested some medication changes in clinic. Ozempic was added. Recommend low carb diet. Home blood glucose monitoring. Call/RTC for persistently elevated readings. Patient has CKD. Creatinine is fairly stable. Recommend she avoid NSAIDs and drink an adequate amount of water. Patient has a h/o anemia but this is also stable. Cholesterol is good. Labs, otherwise, ok/stable. Thanks!      1140- call made to pt. Pt states she was able to get Ozempic but has not started it yet because she wants to finish Victoza first so she is not wasting money. Also states she called someone at diabetes management for help in getting a new blood sugar monitor. States new one will be mailed to her. No other questions asked at this time.

## 2024-03-27 ENCOUNTER — CLINICAL SUPPORT (OUTPATIENT)
Dept: AUDIOLOGY | Facility: CLINIC | Age: 74
End: 2024-03-27
Payer: MEDICARE

## 2024-03-27 DIAGNOSIS — H90.3 SENSORY HEARING LOSS, BILATERAL: Primary | ICD-10-CM

## 2024-03-27 PROCEDURE — 99499 UNLISTED E&M SERVICE: CPT | Mod: S$PBB,,, | Performed by: OTOLARYNGOLOGY

## 2024-03-27 PROCEDURE — 99211 OFF/OP EST MAY X REQ PHY/QHP: CPT | Mod: PBBFAC

## 2024-03-27 NOTE — PROGRESS NOTES
Patient was fit with [employer] Mobile L50-R [id number],  [id number] with size 1 moderate rec and small vented domes.

## 2024-04-04 ENCOUNTER — PATIENT OUTREACH (OUTPATIENT)
Dept: ADMINISTRATIVE | Facility: HOSPITAL | Age: 74
End: 2024-04-04

## 2024-04-04 NOTE — PROGRESS NOTES
Population Health Chart Review & Patient Outreach Details      Further Action Needed If Patient Returns Outreach:      No records to upload at this time  Noted to appt on 24 6 months f/u*Foot and Eye Exam Mammogram Dexa Scan and Vaccines Due for Compliance*       Updates Requested / Reviewed:     []  Care Everywhere    []     []  External Sources ([facility], [facility], [facility], etc.)    [] [facility]   [] [facility]   [] Other:    []  Care Team Updated   []  Removed  or Duplicate Orders   []  Immunization Reconciliation Completed / Queried    [] [place]   [] [place]   [] [place]   [] [place]      Health Maintenance Topics Addressed and Outreach Outcomes / Actions Taken:             Breast Cancer Screening []  Mammogram Order Placed    []  Mammogram Screening Scheduled    []  External Records Requested & Care Team Updated if Applicable    []  External Records Uploaded & Care Team Updated if Applicable    []  Pt Declined Scheduling Mammogram    []  Pt Will Schedule with External Provider / Order Routed & Care Team Updated if Applicable              Cervical Cancer Screening []  Pap Smear Scheduled in Primary Care or OBGYN    []  External Records Requested & Care Team Updated if Applicable       []  External Records Uploaded, Care Team Updated, & History Updated if Applicable    []  Patient Declined Scheduling Pap Smear    []  Patient Will Schedule with External Provider & Care Team Updated if Applicable                  Colorectal Cancer Screening []  Colonoscopy Case Request / Referral / Home Test Order Placed    []  External Records Requested & Care Team Updated if Applicable    []  External Records Uploaded, Care Team Updated, & History Updated if Applicable    []  Patient Declined Completing Colon Cancer Screening    []  Patient Will Schedule with External Provider & Care Team Updated if Applicable    []  Fit Kit Mailed (add the SmartPhrase under additional notes)    []  Reminded Patient to Complete  Home Test                Diabetic Eye Exam []  Eye Exam Screening Order Placed    []  Eye Camera Scheduled or Optometry/Ophthalmology Referral Placed    []  External Records Requested & Care Team Updated if Applicable    []  External Records Uploaded, Care Team Updated, & History Updated if Applicable    []  Patient Declined Scheduling Eye Exam    []  Patient Will Schedule with External Provider & Care Team Updated if Applicable             Blood Pressure Control []  Primary Care Follow Up Visit Scheduled     []  Remote Blood Pressure Reading Captured    []  Patient Declined Remote Reading or Scheduling Appt - Escalated to PCP    []  Patient Will Call Back or Send Portal Message with Reading                 HbA1c & Other Labs []  Overdue Lab(s) Ordered    []  Overdue Lab(s) Scheduled    []  External Records Uploaded & Care Team Updated if Applicable    []  Primary Care Follow Up Visit Scheduled     []  Reminded Patient to Complete A1c Home Test    []  Patient Declined Scheduling Labs or Will Call Back to Schedule    []  Patient Will Schedule with External Provider / Order Routed, & Care Team Updated if Applicable           Primary Care Appointment []  Primary Care Appt Scheduled    []  Patient Declined Scheduling or Will Call Back to Schedule    []  Pt Established with External Provider, Updated Care Team, & Informed Pt to Notify Payor if Applicable           Medication Adherence /    Statin Use []  Primary Care Appointment Scheduled    []  Patient Reminded to  Prescription    []  Patient Declined, Provider Notified if Needed    []  Sent Provider Message to Review to Evaluate Pt for Statin, Add Exclusion Dx Codes, Document   Exclusion in Problem List, Change Statin Intensity Level to Moderate or High Intensity if Applicable                Osteoporosis Screening []  Dexa Order Placed    []  Dexa Appointment Scheduled    []  External Records Requested & Care Team Updated    []  External Records Uploaded, Care  Team Updated, & History Updated if Applicable    []  Patient Declined Scheduling Dexa or Will Call Back to Schedule    []  Patient Will Schedule with External Provider / Order Routed & Care Team Updated if Applicable       Additional Notes:

## 2024-04-11 ENCOUNTER — CLINICAL SUPPORT (OUTPATIENT)
Dept: AUDIOLOGY | Facility: CLINIC | Age: 74
End: 2024-04-11
Payer: MEDICARE

## 2024-04-11 DIAGNOSIS — H90.3 SENSORY HEARING LOSS, BILATERAL: Primary | ICD-10-CM

## 2024-05-14 DIAGNOSIS — E11.69 TYPE 2 DIABETES MELLITUS WITH OTHER SPECIFIED COMPLICATION, WITH LONG-TERM CURRENT USE OF INSULIN: ICD-10-CM

## 2024-05-14 DIAGNOSIS — Z79.4 TYPE 2 DIABETES MELLITUS WITH OTHER SPECIFIED COMPLICATION, WITH LONG-TERM CURRENT USE OF INSULIN: ICD-10-CM

## 2024-07-02 DIAGNOSIS — E11.69 TYPE 2 DIABETES MELLITUS WITH OTHER SPECIFIED COMPLICATION, WITH LONG-TERM CURRENT USE OF INSULIN: ICD-10-CM

## 2024-07-02 DIAGNOSIS — Z79.4 TYPE 2 DIABETES MELLITUS WITH OTHER SPECIFIED COMPLICATION, WITH LONG-TERM CURRENT USE OF INSULIN: ICD-10-CM

## 2024-07-02 RX ORDER — SEMAGLUTIDE 0.68 MG/ML
INJECTION, SOLUTION SUBCUTANEOUS
Qty: 3 ML | Refills: 6 | Status: SHIPPED | OUTPATIENT
Start: 2024-07-02

## 2024-08-01 DIAGNOSIS — E11.69 TYPE 2 DIABETES MELLITUS WITH OTHER SPECIFIED COMPLICATION, WITH LONG-TERM CURRENT USE OF INSULIN: ICD-10-CM

## 2024-08-01 DIAGNOSIS — Z79.4 TYPE 2 DIABETES MELLITUS WITH OTHER SPECIFIED COMPLICATION, WITH LONG-TERM CURRENT USE OF INSULIN: ICD-10-CM

## 2024-08-02 RX ORDER — INSULIN DEGLUDEC 200 U/ML
INJECTION, SOLUTION SUBCUTANEOUS
Qty: 5 PEN | Refills: 5 | Status: SHIPPED | OUTPATIENT
Start: 2024-08-02

## 2024-08-28 ENCOUNTER — OFFICE VISIT (OUTPATIENT)
Dept: FAMILY MEDICINE | Facility: CLINIC | Age: 74
End: 2024-08-28
Payer: MEDICARE

## 2024-08-28 VITALS
HEIGHT: 67 IN | DIASTOLIC BLOOD PRESSURE: 80 MMHG | OXYGEN SATURATION: 97 % | RESPIRATION RATE: 17 BRPM | TEMPERATURE: 99 F | SYSTOLIC BLOOD PRESSURE: 160 MMHG | BODY MASS INDEX: 25.14 KG/M2 | WEIGHT: 160.19 LBS | HEART RATE: 79 BPM

## 2024-08-28 DIAGNOSIS — E78.2 MIXED HYPERLIPIDEMIA: ICD-10-CM

## 2024-08-28 DIAGNOSIS — J30.2 SEASONAL ALLERGIES: ICD-10-CM

## 2024-08-28 DIAGNOSIS — N18.32 STAGE 3B CHRONIC KIDNEY DISEASE: ICD-10-CM

## 2024-08-28 DIAGNOSIS — Z13.820 SCREENING FOR OSTEOPOROSIS: ICD-10-CM

## 2024-08-28 DIAGNOSIS — Z78.0 ASYMPTOMATIC MENOPAUSAL STATE: ICD-10-CM

## 2024-08-28 DIAGNOSIS — Z79.4 TYPE 2 DIABETES MELLITUS WITH OTHER SPECIFIED COMPLICATION, WITH LONG-TERM CURRENT USE OF INSULIN: Primary | ICD-10-CM

## 2024-08-28 DIAGNOSIS — E11.69 TYPE 2 DIABETES MELLITUS WITH OTHER SPECIFIED COMPLICATION, WITH LONG-TERM CURRENT USE OF INSULIN: Primary | ICD-10-CM

## 2024-08-28 DIAGNOSIS — Z12.31 SCREENING MAMMOGRAM FOR BREAST CANCER: ICD-10-CM

## 2024-08-28 DIAGNOSIS — I10 ESSENTIAL HYPERTENSION: ICD-10-CM

## 2024-08-28 LAB
ALBUMIN SERPL BCP-MCNC: 3.9 G/DL (ref 3.5–5)
ALBUMIN/GLOB SERPL: 1.1 {RATIO}
ALP SERPL-CCNC: 109 U/L (ref 55–142)
ALT SERPL W P-5'-P-CCNC: 31 U/L (ref 13–56)
ANION GAP SERPL CALCULATED.3IONS-SCNC: 8 MMOL/L (ref 7–16)
AST SERPL W P-5'-P-CCNC: 25 U/L (ref 15–37)
BASOPHILS # BLD AUTO: 0.05 K/UL (ref 0–0.2)
BASOPHILS NFR BLD AUTO: 0.8 % (ref 0–1)
BILIRUB SERPL-MCNC: 0.7 MG/DL (ref ?–1.2)
BUN SERPL-MCNC: 29 MG/DL (ref 7–18)
BUN/CREAT SERPL: 19 (ref 6–20)
CALCIUM SERPL-MCNC: 11.4 MG/DL (ref 8.5–10.1)
CHLORIDE SERPL-SCNC: 106 MMOL/L (ref 98–107)
CHOLEST SERPL-MCNC: 114 MG/DL (ref 0–200)
CHOLEST/HDLC SERPL: 2 {RATIO}
CO2 SERPL-SCNC: 30 MMOL/L (ref 21–32)
CREAT SERPL-MCNC: 1.55 MG/DL (ref 0.55–1.02)
CREAT UR-MCNC: 131 MG/DL (ref 28–219)
DIFFERENTIAL METHOD BLD: ABNORMAL
EGFR (NO RACE VARIABLE) (RUSH/TITUS): 35 ML/MIN/1.73M2
EOSINOPHIL # BLD AUTO: 0.16 K/UL (ref 0–0.5)
EOSINOPHIL NFR BLD AUTO: 2.5 % (ref 1–4)
ERYTHROCYTE [DISTWIDTH] IN BLOOD BY AUTOMATED COUNT: 15.2 % (ref 11.5–14.5)
EST. AVERAGE GLUCOSE BLD GHB EST-MCNC: 180 MG/DL
GLOBULIN SER-MCNC: 3.4 G/DL (ref 2–4)
GLUCOSE SERPL-MCNC: 175 MG/DL (ref 74–106)
HBA1C MFR BLD HPLC: 7.9 % (ref 4.5–6.6)
HCT VFR BLD AUTO: 35.3 % (ref 38–47)
HDLC SERPL-MCNC: 57 MG/DL (ref 40–60)
HGB BLD-MCNC: 10.8 G/DL (ref 12–16)
IMM GRANULOCYTES # BLD AUTO: 0.03 K/UL (ref 0–0.04)
IMM GRANULOCYTES NFR BLD: 0.5 % (ref 0–0.4)
LDLC SERPL CALC-MCNC: 34 MG/DL
LYMPHOCYTES # BLD AUTO: 1.67 K/UL (ref 1–4.8)
LYMPHOCYTES NFR BLD AUTO: 26.3 % (ref 27–41)
MCH RBC QN AUTO: 27.2 PG (ref 27–31)
MCHC RBC AUTO-ENTMCNC: 30.6 G/DL (ref 32–36)
MCV RBC AUTO: 88.9 FL (ref 80–96)
MICROALBUMIN UR-MCNC: 25.4 MG/DL (ref 0–2.8)
MICROALBUMIN/CREAT RATIO PNL UR: 193.9 MG/G (ref 0–30)
MONOCYTES # BLD AUTO: 0.84 K/UL (ref 0–0.8)
MONOCYTES NFR BLD AUTO: 13.2 % (ref 2–6)
MPC BLD CALC-MCNC: 10.9 FL (ref 9.4–12.4)
NEUTROPHILS # BLD AUTO: 3.61 K/UL (ref 1.8–7.7)
NEUTROPHILS NFR BLD AUTO: 56.7 % (ref 53–65)
NONHDLC SERPL-MCNC: 57 MG/DL
NRBC # BLD AUTO: 0 X10E3/UL
NRBC, AUTO (.00): 0 %
PLATELET # BLD AUTO: 205 K/UL (ref 150–400)
POTASSIUM SERPL-SCNC: 4.7 MMOL/L (ref 3.5–5.1)
PROT SERPL-MCNC: 7.3 G/DL (ref 6.4–8.2)
RBC # BLD AUTO: 3.97 M/UL (ref 4.2–5.4)
SODIUM SERPL-SCNC: 139 MMOL/L (ref 136–145)
TRIGL SERPL-MCNC: 113 MG/DL (ref 35–150)
VLDLC SERPL-MCNC: 23 MG/DL
WBC # BLD AUTO: 6.36 K/UL (ref 4.5–11)

## 2024-08-28 PROCEDURE — 80061 LIPID PANEL: CPT | Mod: ,,, | Performed by: CLINICAL MEDICAL LABORATORY

## 2024-08-28 PROCEDURE — 82570 ASSAY OF URINE CREATININE: CPT | Mod: ,,, | Performed by: CLINICAL MEDICAL LABORATORY

## 2024-08-28 PROCEDURE — 3079F DIAST BP 80-89 MM HG: CPT | Mod: ,,, | Performed by: FAMILY MEDICINE

## 2024-08-28 PROCEDURE — 1126F AMNT PAIN NOTED NONE PRSNT: CPT | Mod: ,,, | Performed by: FAMILY MEDICINE

## 2024-08-28 PROCEDURE — 82043 UR ALBUMIN QUANTITATIVE: CPT | Mod: ,,, | Performed by: CLINICAL MEDICAL LABORATORY

## 2024-08-28 PROCEDURE — 99214 OFFICE O/P EST MOD 30 MIN: CPT | Mod: ,,, | Performed by: FAMILY MEDICINE

## 2024-08-28 PROCEDURE — 80053 COMPREHEN METABOLIC PANEL: CPT | Mod: ,,, | Performed by: CLINICAL MEDICAL LABORATORY

## 2024-08-28 PROCEDURE — 1101F PT FALLS ASSESS-DOCD LE1/YR: CPT | Mod: ,,, | Performed by: FAMILY MEDICINE

## 2024-08-28 PROCEDURE — 85025 COMPLETE CBC W/AUTO DIFF WBC: CPT | Mod: ,,, | Performed by: CLINICAL MEDICAL LABORATORY

## 2024-08-28 PROCEDURE — 83036 HEMOGLOBIN GLYCOSYLATED A1C: CPT | Mod: ,,, | Performed by: CLINICAL MEDICAL LABORATORY

## 2024-08-28 PROCEDURE — 3288F FALL RISK ASSESSMENT DOCD: CPT | Mod: ,,, | Performed by: FAMILY MEDICINE

## 2024-08-28 PROCEDURE — 3052F HG A1C>EQUAL 8.0%<EQUAL 9.0%: CPT | Mod: ,,, | Performed by: FAMILY MEDICINE

## 2024-08-28 PROCEDURE — 1159F MED LIST DOCD IN RCRD: CPT | Mod: ,,, | Performed by: FAMILY MEDICINE

## 2024-08-28 PROCEDURE — 3077F SYST BP >= 140 MM HG: CPT | Mod: ,,, | Performed by: FAMILY MEDICINE

## 2024-08-28 RX ORDER — ROSUVASTATIN CALCIUM 10 MG/1
10 TABLET, COATED ORAL NIGHTLY
Qty: 90 TABLET | Refills: 1 | Status: SHIPPED | OUTPATIENT
Start: 2024-08-28

## 2024-08-28 RX ORDER — SEMAGLUTIDE 0.68 MG/ML
0.5 INJECTION, SOLUTION SUBCUTANEOUS
Qty: 9 ML | Refills: 1 | Status: SHIPPED | OUTPATIENT
Start: 2024-08-28

## 2024-08-28 RX ORDER — INSULIN DEGLUDEC 200 U/ML
56 INJECTION, SOLUTION SUBCUTANEOUS DAILY
Qty: 9 PEN | Refills: 3 | Status: SHIPPED | OUTPATIENT
Start: 2024-08-28

## 2024-08-28 RX ORDER — CANDESARTAN CILEXETIL AND HYDROCHLOROTHIAZIDE 32; 25 MG/1; MG/1
1 TABLET ORAL DAILY
Qty: 90 EACH | Refills: 1 | Status: SHIPPED | OUTPATIENT
Start: 2024-08-28

## 2024-08-28 RX ORDER — GLIPIZIDE 2.5 MG/1
2.5 TABLET, EXTENDED RELEASE ORAL EVERY MORNING
Qty: 90 TABLET | Refills: 1 | Status: SHIPPED | OUTPATIENT
Start: 2024-08-28

## 2024-08-28 RX ORDER — MONTELUKAST SODIUM 10 MG/1
10 TABLET ORAL DAILY
Qty: 90 TABLET | Refills: 1 | Status: SHIPPED | OUTPATIENT
Start: 2024-08-28

## 2024-08-28 RX ORDER — BLOOD SUGAR DIAGNOSTIC
STRIP MISCELLANEOUS
Qty: 300 STRIP | Refills: 3 | Status: SHIPPED | OUTPATIENT
Start: 2024-08-28

## 2024-08-28 NOTE — PROGRESS NOTES
"Clinic Note    Patient Name: Luna Wolf  : 1950  MRN: [id number]    Chief Complaint   Patient presents with    Follow-up     6 month follow up     Health Maintenance     Pneumococcal Vaccines (Age 65+)(1 of 2 - PCV) Never done  Foot Exam Never done  Eye Exam Never done  TETANUS VACCINE Never done  DEXA Scan Never done  Shingles Vaccine(1 of 2) Never done  RSV Vaccine (Age 60+ and Pregnant patients)(1 - 1-dose 60+ series) Never done  COVID-19 Vaccine(2023-24 season) due on 2023  Mammogram due on 2023  Hemoglobin A1c due on 2024        HPI:    Ms. Luna Wolf is a 73 y.o. female who presents to clinic today with CC of follow up on chronic disease processes including Type II DM, HTN, HLD, CKD stage 3b, h/o cervical spinal stenosis s/p surgery. Reports she has progressed from ambulating with walker to cane and is now driving since I saw her the last time.  Reports she is doing much improved.   BP is elevated today. Patient reports medication compliance. Admits she has a monitor at home but does not check daily. Attributes elevation to white coat HTN. Reports BP has been well controlled.  Patient reports chronic issues are well controlled on current medication regimen.  Denies problems or side effects with medications.  Patient is, otherwise, without complaints.     Medications:  Medication List with Changes/Refills   Current Medications    ASPIRIN (ECOTRIN) 81 MG EC TABLET    Take 81 mg by mouth once daily.    BD ULTRA-FINE MINI PEN NEEDLE 31 GAUGE X 3/16" NDLE    For use with insulin/victoza.    BLOOD SUGAR DIAGNOSTIC STRP    For 3 times daily home blood glucose monitoring Dx: Type II DM with hyperglycemia Insulin Dependent E11.65    BLOOD-GLUCOSE METER KIT    For 3 times daily home blood glucose monitoring Dx: Type II DM with hyperglycemia Insulin Dependent E11.65   Changed and/or Refilled Medications    Modified Medication Previous Medication    ACCU-CHEK GAY PLUS TEST STRP STRP " ACCU-CHEK GAY PLUS TEST STRP Strp       For home blood glucose monitoring three times daily. Dx: Type II DM E11.9    50 strips by Other route 2 (two) times daily. Test    ATACAND HCT 32-25 MG TAB ATACAND HCT 32-25 mg Tab       Take 1 tablet by mouth once daily.    Take 1 tablet by mouth once daily.    GLIPIZIDE (GLUCOTROL) 2.5 MG TR24 glipiZIDE (GLUCOTROL) 2.5 MG TR24       Take 1 tablet (2.5 mg total) by mouth every morning.    Take 1 tablet (2.5 mg total) by mouth every morning.    MONTELUKAST (SINGULAIR) 10 MG TABLET montelukast (SINGULAIR) 10 mg tablet       Take 1 tablet (10 mg total) by mouth once daily.    Take 1 tablet (10 mg total) by mouth once daily.    ROSUVASTATIN (CRESTOR) 10 MG TABLET rosuvastatin (CRESTOR) 10 MG tablet       Take 1 tablet (10 mg total) by mouth every evening.    Take 1 tablet (10 mg total) by mouth every evening.    SEMAGLUTIDE (OZEMPIC) 0.25 MG OR 0.5 MG (2 MG/3 ML) PEN INJECTOR semaglutide (OZEMPIC) 0.25 mg or 0.5 mg (2 mg/3 mL) pen injector       Inject 0.5 mg into the skin every 7 days.    INJECT 0.25MG SUBCUTANEOUSLY WEEKLY FOR 1 MONTH, THEN INCREASE TO 0.5MG SUBCUTANEOUS WEEKLY    TRESIBA FLEXTOUCH U-200 200 UNIT/ML (3 ML) INSULIN PEN TRESIBA FLEXTOUCH U-200 200 unit/mL (3 mL) insulin pen       Inject 56 Units into the skin once daily.    ADMINISTER 56 UNITS UNDER THE SKIN EVERY MORNING        Allergies: Ibuprofen      Past Medical History:    Past Medical History:   Diagnosis Date    Breast cancer in female     Cancer     Diabetes mellitus     Hypertension     Mixed hyperlipidemia        Past Surgical History:    Past Surgical History:   Procedure Laterality Date    BREAST BIOPSY      TUBAL LIGATION           Social History:    Social History     Tobacco Use   Smoking Status Never   Smokeless Tobacco Never     Social History     Substance and Sexual Activity   Alcohol Use Never     Social History     Substance and Sexual Activity   Drug Use Never         Family  "History:    Family History   Problem Relation Name Age of Onset    Diabetes Maternal Grandfather         Review of Systems:    Review of Systems   Constitutional:  Negative for appetite change, chills, fatigue, fever and unexpected weight change.   Eyes:  Negative for visual disturbance.   Respiratory:  Negative for cough and shortness of breath.    Cardiovascular:  Negative for chest pain and leg swelling.   Gastrointestinal:  Negative for abdominal pain, change in bowel habit, constipation, diarrhea, nausea and vomiting.   Musculoskeletal:  Positive for arthralgias.   Integumentary:  Negative for rash.   Neurological:  Negative for dizziness and headaches.   Psychiatric/Behavioral:  The patient is not nervous/anxious.         Vitals:    Vitals:    08/28/24 1035 08/28/24 1316   BP: (!) 166/83 (!) 160/80   BP Location: Left arm Left arm   Patient Position: Sitting Sitting   BP Method: Medium (Automatic) Large (Manual)   Pulse: 79    Resp: 17    Temp: 98.5 °F (36.9 °C)    TempSrc: Oral    SpO2: 97%    Weight: 72.7 kg (160 lb 3.2 oz)    Height: 5' 7" (1.702 m)        Body mass index is 25.09 kg/m².    Wt Readings from Last 3 Encounters:   08/28/24 1035 72.7 kg (160 lb 3.2 oz)   02/28/24 1333 72.6 kg (160 lb)   08/29/23 1430 68.5 kg (151 lb)        Physical Exam:    Physical Exam  Constitutional:       General: She is not in acute distress.     Appearance: Normal appearance.   HENT:      Nose: Nose normal.      Mouth/Throat:      Mouth: Mucous membranes are moist.      Pharynx: Oropharynx is clear.   Eyes:      Conjunctiva/sclera: Conjunctivae normal.   Cardiovascular:      Rate and Rhythm: Normal rate and regular rhythm.      Heart sounds: Normal heart sounds. No murmur heard.  Pulmonary:      Effort: Pulmonary effort is normal. No respiratory distress.      Breath sounds: Normal breath sounds. No wheezing, rhonchi or rales.   Abdominal:      General: Bowel sounds are normal.      Palpations: Abdomen is soft.      " Tenderness: There is no abdominal tenderness.   Musculoskeletal:      Cervical back: Neck supple.      Right lower leg: No edema.      Left lower leg: No edema.   Skin:     Findings: No rash.   Neurological:      General: No focal deficit present.      Mental Status: She is alert. Mental status is at baseline.      Gait: Gait abnormal.      Comments: Ambulating with cane   Psychiatric:         Mood and Affect: Mood normal.           Assessment/Plan:   1. Type 2 diabetes mellitus with other specified complication, with long-term current use of insulin  -     ACCU-CHEK GAY PLUS TEST STRP Strp; For home blood glucose monitoring three times daily. Dx: Type II DM E11.9  Dispense: 300 strip; Refill: 3  -     glipiZIDE (GLUCOTROL) 2.5 MG TR24; Take 1 tablet (2.5 mg total) by mouth every morning.  Dispense: 90 tablet; Refill: 1  -     semaglutide (OZEMPIC) 0.25 mg or 0.5 mg (2 mg/3 mL) pen injector; Inject 0.5 mg into the skin every 7 days.  Dispense: 9 mL; Refill: 1  -     TRESIBA FLEXTOUCH U-200 200 unit/mL (3 mL) insulin pen; Inject 56 Units into the skin once daily.  Dispense: 9 Pen; Refill: 3  -     CBC Auto Differential; Future; Expected date: 08/28/2024  -     Comprehensive Metabolic Panel; Future; Expected date: 08/28/2024  -     Lipid Panel; Future; Expected date: 08/28/2024  -     Hemoglobin A1C; Future; Expected date: 08/28/2024  -     Microalbumin/Creatinine Ratio, Urine    2. Seasonal allergies  -     montelukast (SINGULAIR) 10 mg tablet; Take 1 tablet (10 mg total) by mouth once daily.  Dispense: 90 tablet; Refill: 1    3. Essential hypertension  -     ATACAND HCT 32-25 mg Tab; Take 1 tablet by mouth once daily.  Dispense: 90 each; Refill: 1  -     CBC Auto Differential; Future; Expected date: 08/28/2024  -     Comprehensive Metabolic Panel; Future; Expected date: 08/28/2024  -     Lipid Panel; Future; Expected date: 08/28/2024  -     Microalbumin/Creatinine Ratio, Urine  - BP is elevated in clinic today.  Patient reports normal readings at home. Will have nurse follow up with phone call with home BP reading in 1-2 weeks. If remains elevated patient will need to RTC for medication adjustments.  - DASH diet and exercise  - Encouraged medication compliance  - Continue home blood pressure monitoring. Call/RTC for persistently elevated readings.    4. Mixed hyperlipidemia  -     rosuvastatin (CRESTOR) 10 MG tablet; Take 1 tablet (10 mg total) by mouth every evening.  Dispense: 90 tablet; Refill: 1  -     CBC Auto Differential; Future; Expected date: 08/28/2024  -     Comprehensive Metabolic Panel; Future; Expected date: 08/28/2024  -     Lipid Panel; Future; Expected date: 08/28/2024    5. Stage 3b chronic kidney disease  -     CBC Auto Differential; Future; Expected date: 08/28/2024  -     Comprehensive Metabolic Panel; Future; Expected date: 08/28/2024  -     Microalbumin/Creatinine Ratio, Urine    6. Screening mammogram for breast cancer  -     Mammo Digital Screening Bilat w/ Omkar; Future; Expected date: 08/28/2024    7. Screening for osteoporosis  -     DXA Bone Density Axial Skeleton 1 or more sites; Future; Expected date: 08/28/2024    8. Asymptomatic menopausal state  -     DXA Bone Density Axial Skeleton 1 or more sites; Future; Expected date: 08/28/2024         Active Problem List with Overview Notes    Diagnosis Date Noted    Stage 3b chronic kidney disease 03/01/2024    Type 2 diabetes mellitus, with long-term current use of insulin 08/28/2023    Essential hypertension 08/28/2023    Mixed hyperlipidemia 08/28/2023    Cervical spinal stenosis 08/28/2023     S/p surgery with Dr. Wagoner (Neurosugery) in [place], [place]      Hx of colonic polyps 12/28/2022    Diverticulosis of colon 12/28/2022        Health Maintenance:  Health Maintenance   Topic Date Due    Foot Exam  Never done    Eye Exam  Never done    TETANUS VACCINE  Never done    DEXA Scan  Never done    Shingles Vaccine (1 of 2) Never done    Mammogram   12/01/2023    Hemoglobin A1c  05/28/2024    Low Dose Statin  02/28/2025    Lipid Panel  02/28/2025    Colorectal Cancer Screening  12/28/2027    Hepatitis C Screening  Completed   Eye exam - plans to scheduled with [facility] now that she can drive    RTC in 6 months for chronic follow up.  RTC sooner if needed.   Patient voiced understanding and is agreeable to plan.      Kerri Mcdaniels MD    Family Medicine

## 2024-09-09 ENCOUNTER — TELEPHONE (OUTPATIENT)
Dept: FAMILY MEDICINE | Facility: CLINIC | Age: 74
End: 2024-09-09
Payer: MEDICARE

## 2024-09-09 DIAGNOSIS — N18.9 CHRONIC KIDNEY DISEASE, UNSPECIFIED CKD STAGE: Primary | ICD-10-CM

## 2024-09-09 DIAGNOSIS — E11.69 TYPE 2 DIABETES MELLITUS WITH OTHER SPECIFIED COMPLICATION, WITH LONG-TERM CURRENT USE OF INSULIN: Primary | Chronic | ICD-10-CM

## 2024-09-09 DIAGNOSIS — Z79.4 TYPE 2 DIABETES MELLITUS WITH OTHER SPECIFIED COMPLICATION, WITH LONG-TERM CURRENT USE OF INSULIN: Primary | Chronic | ICD-10-CM

## 2024-09-09 DIAGNOSIS — E83.52 SERUM CALCIUM ELEVATED: ICD-10-CM

## 2024-09-09 RX ORDER — GLIPIZIDE 5 MG/1
5 TABLET ORAL
Qty: 180 TABLET | Refills: 1 | Status: SHIPPED | OUTPATIENT
Start: 2024-09-09 | End: 2025-09-09

## 2024-09-09 NOTE — TELEPHONE ENCOUNTER
Contacted pt in regards to lab results. Informed pt of lab results and repeat lab. Pt voiced understanding and had no further questions.     ----- Message from Sushila Mcdaniels MD sent at 8/30/2024  9:23 AM CDT -----  Please call patient regarding lab results. HbA1C is 7.9. DM control is improved but not at goal. Recommend increase glipizide from 2.5 to 5 mg PO daily. Low carb diet.   Patient has CKD. Creatinine is stable. Avoid NSAIDs and drink an adequate amount of water. Calcium is elevated. Recommend RTC in 2 weeks for lab only for repeat CMP, vitamin D, and PTH levels.   Anemia is stable.   Labs, otherwise, ok/stable.   Thanks!

## 2024-09-19 ENCOUNTER — TELEPHONE (OUTPATIENT)
Dept: FAMILY MEDICINE | Facility: CLINIC | Age: 74
End: 2024-09-19
Payer: MEDICARE

## 2024-09-19 DIAGNOSIS — E21.3 HYPERPARATHYROIDISM: Primary | ICD-10-CM

## 2024-09-19 NOTE — TELEPHONE ENCOUNTER
Pt returned phone call from earlier. Informed of lab results. Pt voiced understanding and agreed to endo referral.

## 2024-09-19 NOTE — TELEPHONE ENCOUNTER
Call made to pt daughter. Informed her lab results and new referral to endocrinology dr. Mendes at [facility] in [place]. Daughter voiced understanding and thanked me for the call back.

## 2024-09-19 NOTE — TELEPHONE ENCOUNTER
----- Message from Tracy Reyes sent at 9/19/2024  1:56 PM CDT -----  Regarding: CALL BACK  PT'S DAUGHTER DARLING WOULD LIKE A CALL BACK [phone]. PT'S DAUGHTER STATES AFTER RESULTS OF PT'S BLOOD WORK CAME BACK, HER MOM STATES SHE IS BEING REFERRED TO SEE ANOTHER DOCTOR BUT IS UNSURE ON WHY A REFERRAL WAS SENT IN. DARLING WOULD LIKE A CALL BACK ASA WITH THIS INFO.

## 2024-09-19 NOTE — TELEPHONE ENCOUNTER
----- Message from Sushila Mcdaniels MD sent at 9/19/2024  7:54 AM CDT -----  Please call patient regarding lab results. Calcium is back down in normal range. Vitamin D is within normal range. PTH is elevated. Recommend referral to Endocrinology for evaluation for hyperparathyroidism (Dr. Mendes at [facility]) - external referral.  Patient has CKD but labs are stable. Avoid NSAIDs and drink an adequate amount of water.   Thanks!   Patient Education        Chest Pain: Care Instructions  Your Care Instructions     There are many things that can cause chest pain. Some are not serious and will get better on their own in a few days. But some kinds of chest pain need more testing and treatment. Your doctor may have recommended a follow-up visit in the next 8 to 12 hours. If you are not getting better, you may need more tests or treatment. Even though your doctor has released you, you still need to watch for any problems. The doctor carefully checked you, but sometimes problems can develop later. If you have new symptoms or if your symptoms do not get better, get medical care right away. If you have worse or different chest pain or pressure that lasts more than 5 minutes or you passed out (lost consciousness), call 911 or seek other emergency help right away. A medical visit is only one step in your treatment. Even if you feel better, you still need to do what your doctor recommends, such as going to all suggested follow-up appointments and taking medicines exactly as directed. This will help you recover and help prevent future problems. How can you care for yourself at home? · Rest until you feel better. · Take your medicine exactly as prescribed. Call your doctor if you think you are having a problem with your medicine. · Do not drive after taking a prescription pain medicine. When should you call for help? Call 911 if:     · You passed out (lost consciousness).     · You have severe difficulty breathing.     · You have symptoms of a heart attack. These may include:  ? Chest pain or pressure, or a strange feeling in your chest.  ? Sweating. ? Shortness of breath. ? Nausea or vomiting. ? Pain, pressure, or a strange feeling in your back, neck, jaw, or upper belly or in one or both shoulders or arms. ? Lightheadedness or sudden weakness. ? A fast or irregular heartbeat.   After you call 911, the  may tell you to chew 1 adult-strength or 2 to 4 low-dose aspirin. Wait for an ambulance. Do not try to drive yourself. Call your doctor today if:     · You have any trouble breathing.     · Your chest pain gets worse.     · You are dizzy or lightheaded, or you feel like you may faint.     · You are not getting better as expected.     · You are having new or different chest pain. Where can you learn more? Go to http://shannon-heriberto.info/  Enter A120 in the search box to learn more about \"Chest Pain: Care Instructions. \"  Current as of: June 26, 2019               Content Version: 12.6  © 7074-0881 [employer]. Care instructions adapted under license by [employer] (which disclaims liability or warranty for this information). If you have questions about a medical condition or this instruction, always ask your healthcare professional. [address] any warranty or liability for your use of this information.

## 2024-09-29 DIAGNOSIS — E11.69 TYPE 2 DIABETES MELLITUS WITH OTHER SPECIFIED COMPLICATION, WITH LONG-TERM CURRENT USE OF INSULIN: ICD-10-CM

## 2024-09-29 DIAGNOSIS — Z79.4 TYPE 2 DIABETES MELLITUS WITH OTHER SPECIFIED COMPLICATION, WITH LONG-TERM CURRENT USE OF INSULIN: ICD-10-CM

## 2024-09-30 RX ORDER — PEN NEEDLE, DIABETIC 31 GX5/16"
NEEDLE, DISPOSABLE MISCELLANEOUS
Qty: 100 EACH | Refills: 5 | Status: SHIPPED | OUTPATIENT
Start: 2024-09-30

## 2024-11-04 LAB
LEFT EYE DM RETINOPATHY: NEGATIVE
RIGHT EYE DM RETINOPATHY: NEGATIVE

## 2024-12-26 ENCOUNTER — OFFICE VISIT (OUTPATIENT)
Dept: FAMILY MEDICINE | Facility: CLINIC | Age: 74
End: 2024-12-26
Payer: MEDICARE

## 2024-12-26 VITALS
OXYGEN SATURATION: 98 % | HEART RATE: 96 BPM | BODY MASS INDEX: 28.78 KG/M2 | WEIGHT: 183.38 LBS | TEMPERATURE: 98 F | RESPIRATION RATE: 16 BRPM | SYSTOLIC BLOOD PRESSURE: 137 MMHG | HEIGHT: 67 IN | DIASTOLIC BLOOD PRESSURE: 88 MMHG

## 2024-12-26 DIAGNOSIS — R09.81 NASAL CONGESTION: Primary | ICD-10-CM

## 2024-12-26 PROCEDURE — 99212 OFFICE O/P EST SF 10 MIN: CPT | Mod: ,,, | Performed by: NURSE PRACTITIONER

## 2024-12-26 PROCEDURE — 3079F DIAST BP 80-89 MM HG: CPT | Mod: ,,, | Performed by: NURSE PRACTITIONER

## 2024-12-26 PROCEDURE — 1101F PT FALLS ASSESS-DOCD LE1/YR: CPT | Mod: ,,, | Performed by: NURSE PRACTITIONER

## 2024-12-26 PROCEDURE — 1159F MED LIST DOCD IN RCRD: CPT | Mod: ,,, | Performed by: NURSE PRACTITIONER

## 2024-12-26 PROCEDURE — 3075F SYST BP GE 130 - 139MM HG: CPT | Mod: ,,, | Performed by: NURSE PRACTITIONER

## 2024-12-26 PROCEDURE — 3288F FALL RISK ASSESSMENT DOCD: CPT | Mod: ,,, | Performed by: NURSE PRACTITIONER

## 2024-12-26 PROCEDURE — 1160F RVW MEDS BY RX/DR IN RCRD: CPT | Mod: ,,, | Performed by: NURSE PRACTITIONER

## 2024-12-26 PROCEDURE — 1126F AMNT PAIN NOTED NONE PRSNT: CPT | Mod: ,,, | Performed by: NURSE PRACTITIONER

## 2024-12-26 RX ORDER — FLUTICASONE PROPIONATE 50 MCG
1 SPRAY, SUSPENSION (ML) NASAL 2 TIMES DAILY PRN
Qty: 16 G | Refills: 0 | Status: SHIPPED | OUTPATIENT
Start: 2024-12-26

## 2024-12-26 NOTE — PROGRESS NOTES
"Clinic Note    Luna Wolf is a 74 y.o. female     Chief Complaint:   Chief Complaint   Patient presents with    Sinusitis    Nasal Congestion     X 1 week. Feels stopped up        Subjective:    Patient complains of nasal congestion and runny nose. Admits to sinus pressure. Symptoms X 1 week. Has been taking claritin prn. Admits to a little blood in nasal drainage at times. Denies fever, sore throat, or cough.     Sinusitis  Associated symptoms include congestion and sinus pressure. Pertinent negatives include no coughing, ear pain, headaches, shortness of breath or sore throat.        Allergies:   Review of patient's allergies indicates:   Allergen Reactions    Ibuprofen      Caused upset stomache        Past Medical History:  Past Medical History:   Diagnosis Date    Breast cancer in female     Cancer     Diabetes mellitus     Hypertension     Mixed hyperlipidemia         Current Medications:    Current Outpatient Medications:     ACCU-CHEK GAY PLUS TEST STRP Strp, For home blood glucose monitoring three times daily. Dx: Type II DM E11.9, Disp: 300 strip, Rfl: 3    aspirin (ECOTRIN) 81 MG EC tablet, Take 81 mg by mouth once daily., Disp: , Rfl:     ATACAND HCT 32-25 mg Tab, Take 1 tablet by mouth once daily., Disp: 90 each, Rfl: 1    BD ULTRA-FINE MINI PEN NEEDLE 31 gauge x 3/16" Ndle, USE AS DIRECTED WITH VICTOZA, Disp: 100 each, Rfl: 5    blood sugar diagnostic Strp, For 3 times daily home blood glucose monitoring Dx: Type II DM with hyperglycemia Insulin Dependent E11.65, Disp: 270 strip, Rfl: 3    blood-glucose meter kit, For 3 times daily home blood glucose monitoring Dx: Type II DM with hyperglycemia Insulin Dependent E11.65, Disp: 1 each, Rfl: 0    glipiZIDE (GLUCOTROL) 5 MG tablet, Take 1 tablet (5 mg total) by mouth 2 (two) times daily before meals., Disp: 180 tablet, Rfl: 1    montelukast (SINGULAIR) 10 mg tablet, Take 1 tablet (10 mg total) by mouth once daily., Disp: 90 tablet, Rfl: 1    " "rosuvastatin (CRESTOR) 10 MG tablet, Take 1 tablet (10 mg total) by mouth every evening., Disp: 90 tablet, Rfl: 1    semaglutide (OZEMPIC) 0.25 mg or 0.5 mg (2 mg/3 mL) pen injector, Inject 0.5 mg into the skin every 7 days., Disp: 9 mL, Rfl: 1    TRESIBA FLEXTOUCH U-200 200 unit/mL (3 mL) insulin pen, Inject 56 Units into the skin once daily., Disp: 9 Pen, Rfl: 3    chlorpheniramine-phenylephrine 4-10 mg per tablet, Take 1 tablet by mouth every 6 (six) hours as needed for Congestion., Disp: 30 tablet, Rfl: 0    fluticasone propionate (FLONASE) 50 mcg/actuation nasal spray, 1 spray (50 mcg total) by Each Nostril route 2 (two) times daily as needed for Rhinitis., Disp: 16 g, Rfl: 0       Review of Systems   Constitutional:  Negative for fever.   HENT:  Positive for nasal congestion, rhinorrhea and sinus pressure/congestion. Negative for ear pain and sore throat.    Respiratory:  Negative for cough and shortness of breath.    Cardiovascular:  Negative for chest pain, palpitations and leg swelling.   Gastrointestinal:  Negative for abdominal pain, constipation, diarrhea, nausea and vomiting.   Genitourinary:  Negative for dysuria.   Neurological:  Negative for headaches.          Objective:    /88 (BP Location: Left arm, Patient Position: Sitting)   Pulse 96   Temp 97.9 °F (36.6 °C) (Oral)   Resp 16   Ht 5' 7" (1.702 m)   Wt 83.2 kg (183 lb 6.4 oz)   SpO2 98%   BMI 28.72 kg/m²      Physical Exam  Constitutional:       Appearance: Normal appearance.   HENT:      Nose: Congestion and rhinorrhea present.      Right Sinus: Maxillary sinus tenderness and frontal sinus tenderness present.      Left Sinus: Maxillary sinus tenderness and frontal sinus tenderness present.      Mouth/Throat:      Pharynx: No oropharyngeal exudate or posterior oropharyngeal erythema.   Eyes:      Extraocular Movements: Extraocular movements intact.   Cardiovascular:      Rate and Rhythm: Normal rate and regular rhythm.      Pulses: " Normal pulses.      Heart sounds: Normal heart sounds.   Pulmonary:      Effort: Pulmonary effort is normal.      Breath sounds: Normal breath sounds.   Musculoskeletal:      Cervical back: Neck supple.   Lymphadenopathy:      Cervical: No cervical adenopathy.   Neurological:      Mental Status: She is alert and oriented to person, place, and time.          Assessment and Plan:    1. Nasal congestion         Nasal congestion  -     chlorpheniramine-phenylephrine 4-10 mg per tablet; Take 1 tablet by mouth every 6 (six) hours as needed for Congestion.  Dispense: 30 tablet; Refill: 0  -     fluticasone propionate (FLONASE) 50 mcg/actuation nasal spray; 1 spray (50 mcg total) by Each Nostril route 2 (two) times daily as needed for Rhinitis.  Dispense: 16 g; Refill: 0          There are no Patient Instructions on file for this visit.   Follow up if symptoms worsen or fail to improve.

## 2025-03-10 NOTE — PROGRESS NOTES
"  Luna Wolf presented for a follow-up Medicare AWV today. The following components were reviewed and updated:    Medical history  Family History  Social history  Allergies and Current Medications  Health Risk Assessment  Health Maintenance  Care Team    **See Completed Assessments for Annual Wellness visit with in the encounter summary    The following assessments were completed:  Depression Screening  Cognitive function Screening  Timed Get Up Test  Whisper Test      Opioid documentation:      Patient does not have a current opioid prescription.   Does not       Vitals:    03/11/25 0932 03/11/25 0959   BP: (!) 163/77 (!) 150/80   BP Location: Left arm Left arm   Patient Position: Sitting Sitting   Pulse: 86    Resp: 14    Temp: 97.5 °F (36.4 °C)    TempSrc: Oral    SpO2: 95%    Weight: 83.5 kg (184 lb)    Height: 5' 7" (1.702 m)      Body mass index is 28.82 kg/m².       Physical Exam  Constitutional:       Appearance: Normal appearance.   Eyes:      Extraocular Movements: Extraocular movements intact.   Cardiovascular:      Rate and Rhythm: Normal rate and regular rhythm.      Pulses: Normal pulses.      Heart sounds: Normal heart sounds.   Pulmonary:      Effort: Pulmonary effort is normal.      Breath sounds: Normal breath sounds.   Abdominal:      Palpations: Abdomen is soft.      Tenderness: There is no abdominal tenderness. There is no guarding or rebound.   Musculoskeletal:      Right lower leg: No edema.      Left lower leg: No edema.      Comments: cane   Neurological:      Mental Status: She is alert and oriented to person, place, and time.      Gait: Gait abnormal.   Psychiatric:         Mood and Affect: Mood normal.       Diagnoses and health risks identified today and associated recommendations/orders:    1. Encounter for subsequent annual wellness visit (AWV) in Medicare patient  -recommend annual wellness exam  -appointment given for next year    2. Type 2 diabetes mellitus with other specified " complication, with long-term current use of insulin  -     Hemoglobin A1C; Future; Expected date: 03/11/2025  -     blood sugar diagnostic Strp; To check BG 1 times daily, to use with insurance preferred meter  Dispense: 100 strip; Refill: 5  -     CBC Auto Differential; Future; Expected date: 03/11/2025  -     Comprehensive Metabolic Panel; Future; Expected date: 03/11/2025  -continue current medications  -diabetic diet and exercise  -unsure of brand of meter. Strips sent to pharmacy    3. Hyperparathyroidism  -will continue to monitor    4. Essential hypertension  -     CBC Auto Differential; Future; Expected date: 03/11/2025  -     Comprehensive Metabolic Panel; Future; Expected date: 03/11/2025  -continue atacand hctz.  -low salt diet and exercise  -will do nurse call in 2 weeks for re-evaluation of bp    5. Mixed hyperlipidemia  -     Lipid Panel; Future; Expected date: 03/11/2025  -stable on statin    6. Stage 3b chronic kidney disease  -avoid nsaids  -cmp    7. Encounter for preventive health examination  -recommend routine exams    8. BMI 28.0-28.9,adult  -diet and exercise         Provided [place] with a 5-10 year written screening schedule and personal prevention plan. Recommendations were developed using the USPSTF age appropriate recommendations. Education, counseling, and referrals were provided as needed.  After Visit Summary printed and given to patient which includes a list of additional screenings\tests needed.    Health Maintenance Due   Topic Date Due    Diabetic Eye Exam  Never done    TETANUS VACCINE  Never done    Shingles Vaccine (1 of 2) Never done    Pneumococcal Vaccines (Age 50+) (2 of 2 - PCV) 04/30/2015    RSV Vaccine (Age 60+ and Pregnant patients) (1 - 1-dose 75+ series) Never done    COVID-19 Vaccine (5 - 2024-25 season) 09/01/2024    Hemoglobin A1c  02/28/2025      [place] Dr. Luly Santana ms for last eye exam report   Declines flu, tetanus, shingles, pneumonia, covid booster and rsv  vaccine  Hgb A1c ordered     Follow up in about 54 weeks (around 3/24/2026) for in 1 year for annual wellness visit at 8:00a.m..      LEONILA MCCARTY RN      I offered to discuss advanced care planning, including how to pick a person who would make decisions for you if you were unable to make them for yourself, called a health care power of , and what kind of decisions you might make such as use of life sustaining treatments such as ventilators and tube feeding when faced with a life limiting illness recorded on a living will that they will need to know. (How you want to be cared for as you near the end of your natural life)     X Patient is interested in learning more about how to make advanced directives.  I provided them paperwork and offered to discuss this with them.

## 2025-03-11 ENCOUNTER — OFFICE VISIT (OUTPATIENT)
Dept: FAMILY MEDICINE | Facility: CLINIC | Age: 75
End: 2025-03-11
Payer: MEDICARE

## 2025-03-11 VITALS
OXYGEN SATURATION: 95 % | BODY MASS INDEX: 28.88 KG/M2 | DIASTOLIC BLOOD PRESSURE: 80 MMHG | HEART RATE: 86 BPM | WEIGHT: 184 LBS | HEIGHT: 67 IN | TEMPERATURE: 98 F | RESPIRATION RATE: 14 BRPM | SYSTOLIC BLOOD PRESSURE: 150 MMHG

## 2025-03-11 DIAGNOSIS — E11.69 TYPE 2 DIABETES MELLITUS WITH OTHER SPECIFIED COMPLICATION, WITH LONG-TERM CURRENT USE OF INSULIN: ICD-10-CM

## 2025-03-11 DIAGNOSIS — N18.32 STAGE 3B CHRONIC KIDNEY DISEASE: ICD-10-CM

## 2025-03-11 DIAGNOSIS — E21.3 HYPERPARATHYROIDISM: ICD-10-CM

## 2025-03-11 DIAGNOSIS — E78.2 MIXED HYPERLIPIDEMIA: ICD-10-CM

## 2025-03-11 DIAGNOSIS — Z79.4 TYPE 2 DIABETES MELLITUS WITH OTHER SPECIFIED COMPLICATION, WITH LONG-TERM CURRENT USE OF INSULIN: ICD-10-CM

## 2025-03-11 DIAGNOSIS — Z00.00 ENCOUNTER FOR PREVENTIVE HEALTH EXAMINATION: ICD-10-CM

## 2025-03-11 DIAGNOSIS — Z00.00 ENCOUNTER FOR SUBSEQUENT ANNUAL WELLNESS VISIT (AWV) IN MEDICARE PATIENT: Primary | ICD-10-CM

## 2025-03-11 DIAGNOSIS — I10 ESSENTIAL HYPERTENSION: ICD-10-CM

## 2025-03-11 LAB
ALBUMIN SERPL BCP-MCNC: 3.8 G/DL (ref 3.4–4.8)
ALBUMIN/GLOB SERPL: 1.3 {RATIO}
ALP SERPL-CCNC: 118 U/L (ref 40–150)
ALT SERPL W P-5'-P-CCNC: 18 U/L
ANION GAP SERPL CALCULATED.3IONS-SCNC: 12 MMOL/L (ref 7–16)
AST SERPL W P-5'-P-CCNC: 26 U/L (ref 11–45)
BASOPHILS # BLD AUTO: 0.05 K/UL (ref 0–0.2)
BASOPHILS NFR BLD AUTO: 0.8 % (ref 0–1)
BILIRUB SERPL-MCNC: 0.6 MG/DL
BUN SERPL-MCNC: 29 MG/DL (ref 10–20)
BUN/CREAT SERPL: 17 (ref 6–20)
CALCIUM SERPL-MCNC: 9.7 MG/DL (ref 8.4–10.2)
CHLORIDE SERPL-SCNC: 104 MMOL/L (ref 98–107)
CHOLEST SERPL-MCNC: 97 MG/DL
CHOLEST/HDLC SERPL: 2.1 {RATIO}
CO2 SERPL-SCNC: 27 MMOL/L (ref 23–31)
CREAT SERPL-MCNC: 1.71 MG/DL (ref 0.55–1.02)
DIFFERENTIAL METHOD BLD: ABNORMAL
EGFR (NO RACE VARIABLE) (RUSH/TITUS): 31 ML/MIN/1.73M2
EOSINOPHIL # BLD AUTO: 0.23 K/UL (ref 0–0.5)
EOSINOPHIL NFR BLD AUTO: 3.7 % (ref 1–4)
ERYTHROCYTE [DISTWIDTH] IN BLOOD BY AUTOMATED COUNT: 13.9 % (ref 11.5–14.5)
EST. AVERAGE GLUCOSE BLD GHB EST-MCNC: 229 MG/DL
GLOBULIN SER-MCNC: 3 G/DL (ref 2–4)
GLUCOSE SERPL-MCNC: 209 MG/DL (ref 82–115)
HBA1C MFR BLD HPLC: 9.6 %
HCT VFR BLD AUTO: 35.7 % (ref 38–47)
HDLC SERPL-MCNC: 46 MG/DL (ref 35–60)
HGB BLD-MCNC: 10.7 G/DL (ref 12–16)
IMM GRANULOCYTES # BLD AUTO: 0.01 K/UL (ref 0–0.04)
IMM GRANULOCYTES NFR BLD: 0.2 % (ref 0–0.4)
LDLC SERPL CALC-MCNC: 31 MG/DL
LYMPHOCYTES # BLD AUTO: 1.57 K/UL (ref 1–4.8)
LYMPHOCYTES NFR BLD AUTO: 25.4 % (ref 27–41)
MCH RBC QN AUTO: 27 PG (ref 27–31)
MCHC RBC AUTO-ENTMCNC: 30 G/DL (ref 32–36)
MCV RBC AUTO: 89.9 FL (ref 80–96)
MONOCYTES # BLD AUTO: 0.78 K/UL (ref 0–0.8)
MONOCYTES NFR BLD AUTO: 12.6 % (ref 2–6)
MPC BLD CALC-MCNC: 10.4 FL (ref 9.4–12.4)
NEUTROPHILS # BLD AUTO: 3.54 K/UL (ref 1.8–7.7)
NEUTROPHILS NFR BLD AUTO: 57.3 % (ref 53–65)
NONHDLC SERPL-MCNC: 51 MG/DL
NRBC # BLD AUTO: 0 X10E3/UL
NRBC, AUTO (.00): 0 %
PLATELET # BLD AUTO: 181 K/UL (ref 150–400)
POTASSIUM SERPL-SCNC: 4 MMOL/L (ref 3.5–5.1)
PROT SERPL-MCNC: 6.8 G/DL (ref 5.8–7.6)
RBC # BLD AUTO: 3.97 M/UL (ref 4.2–5.4)
SODIUM SERPL-SCNC: 139 MMOL/L (ref 136–145)
TRIGL SERPL-MCNC: 99 MG/DL (ref 37–140)
VLDLC SERPL-MCNC: 20 MG/DL
WBC # BLD AUTO: 6.18 K/UL (ref 4.5–11)

## 2025-03-11 PROCEDURE — 85025 COMPLETE CBC W/AUTO DIFF WBC: CPT | Mod: ,,, | Performed by: CLINICAL MEDICAL LABORATORY

## 2025-03-11 PROCEDURE — 3288F FALL RISK ASSESSMENT DOCD: CPT | Mod: ,,, | Performed by: NURSE PRACTITIONER

## 2025-03-11 PROCEDURE — G0439 PPPS, SUBSEQ VISIT: HCPCS | Mod: ,,, | Performed by: NURSE PRACTITIONER

## 2025-03-11 PROCEDURE — 1160F RVW MEDS BY RX/DR IN RCRD: CPT | Mod: ,,, | Performed by: NURSE PRACTITIONER

## 2025-03-11 PROCEDURE — 80053 COMPREHEN METABOLIC PANEL: CPT | Mod: ,,, | Performed by: CLINICAL MEDICAL LABORATORY

## 2025-03-11 PROCEDURE — 80061 LIPID PANEL: CPT | Mod: ,,, | Performed by: CLINICAL MEDICAL LABORATORY

## 2025-03-11 PROCEDURE — 83036 HEMOGLOBIN GLYCOSYLATED A1C: CPT | Mod: ,,, | Performed by: CLINICAL MEDICAL LABORATORY

## 2025-03-11 PROCEDURE — 3079F DIAST BP 80-89 MM HG: CPT | Mod: ,,, | Performed by: NURSE PRACTITIONER

## 2025-03-11 PROCEDURE — 1158F ADVNC CARE PLAN TLK DOCD: CPT | Mod: ,,, | Performed by: NURSE PRACTITIONER

## 2025-03-11 PROCEDURE — 1159F MED LIST DOCD IN RCRD: CPT | Mod: ,,, | Performed by: NURSE PRACTITIONER

## 2025-03-11 PROCEDURE — 1170F FXNL STATUS ASSESSED: CPT | Mod: ,,, | Performed by: NURSE PRACTITIONER

## 2025-03-11 PROCEDURE — 1126F AMNT PAIN NOTED NONE PRSNT: CPT | Mod: ,,, | Performed by: NURSE PRACTITIONER

## 2025-03-11 PROCEDURE — 1101F PT FALLS ASSESS-DOCD LE1/YR: CPT | Mod: ,,, | Performed by: NURSE PRACTITIONER

## 2025-03-11 PROCEDURE — 3077F SYST BP >= 140 MM HG: CPT | Mod: ,,, | Performed by: NURSE PRACTITIONER

## 2025-03-11 RX ORDER — BLOOD SUGAR DIAGNOSTIC
STRIP MISCELLANEOUS
Qty: 300 STRIP | Refills: 3 | Status: SHIPPED | OUTPATIENT
Start: 2025-03-11

## 2025-03-11 NOTE — PATIENT INSTRUCTIONS
Counseling and Referral of Other Preventative  (Italic type indicates deductible and co-insurance are waived)    Patient Name: Luna Wolf  Today's Date: 3/11/2025    Health Maintenance       Date Due Completion Date    Diabetic Eye Exam Never done ---    TETANUS VACCINE Never done ---    Shingles Vaccine (1 of 2) Never done ---    Pneumococcal Vaccines (Age 50+) (2 of 2 - PCV) 04/30/2015 4/30/2014    RSV Vaccine (Age 60+ and Pregnant patients) (1 - 1-dose 75+ series) Never done ---    Influenza Vaccine (1) 09/01/2024 2/28/2024 (Declined)    Override on 2/28/2024: Declined    COVID-19 Vaccine (5 - 2024-25 season) 09/01/2024 8/11/2022    Hemoglobin A1c 02/28/2025 8/28/2024    Diabetes Urine Screening 08/28/2025 8/28/2024    Lipid Panel 08/28/2025 8/28/2024    Mammogram 10/11/2025 10/11/2024    DEXA Scan 10/11/2027 10/11/2024        No orders of the defined types were placed in this encounter.    The following information is provided to all patients.  This information is to help you find resources for any of the problems found today that may be affecting your health:                  Living healthy guide: ms.gov    Understanding Diabetes: www.diabetes.org      Eating healthy: www.cdc.gov/healthyweight      CDC home safety checklist: www.cdc.gov/steadi/patient.html      Agency on Aging: ms.gov    Alcoholics anonymous (AA): www.aa.org      Physical Activity: www.romy.nih.gov/so0hiov      Tobacco use: ms.gov

## 2025-03-11 NOTE — LETTER
AUTHORIZATION FOR RELEASE OF   CONFIDENTIAL INFORMATION    Dear Dr. Alvarez Mauricio ,    We are seeing Luna Wolf, date of birth 1950, in the clinic at [facility]. Sushila Mcdaniels MD is the patient's PCP. Luna Wolf has an outstanding lab/procedure at the time we reviewed her chart. In order to help keep her health information updated, she has authorized us to request the following medical record(s):        (  )  MAMMOGRAM                                      (  )  COLONOSCOPY      (  )  PAP SMEAR                                          (  )  OUTSIDE LAB RESULTS     (  )  DEXA SCAN                                          ( x )  EYE EXAM            (  )  FOOT EXAM                                          (  )  ENTIRE RECORD     (  )  OUTSIDE IMMUNIZATIONS                 (  )  _______________         Please fax records to [facility], Hailey-Sharp, Anna-Marie, MD, [phone]     If you have any questions, please contact Sherry Parikh rn awv nurse    at [phone].           Patient Name: Luna Wolf  : 1950  Patient Phone #: [phone]                            Luna Wolf  MRN: [id number]  : 1950  Age: 74 y.o.  Sex: female         Patient/Legal Guardian Signature  This signature was collected at 2025    SELF       _______________________________   Printed Name/Relationship to Patient      Consent for Examination and Treatment: I hereby authorize the providers and employees of [facility] ([facility]) to provide medical treatment/services which includes, but is not limited to, performing and administering tests and diagnostic procedures that are deemed necessary, including, but not limited to, imaging examinations, blood tests and other laboratory procedures as may be required by the hospital, clinic, or may be ordered by my physician(s) or persons working under the general and/or special instructions of my physician(s).      I understand and agree  that this consent covers all authorized persons, including but not limited to physicians, residents, nurse practitioners, physicians' assistants, specialists, consultants, student nurses, and independently contracted physicians, who are called upon by the physician in charge, to carry out the diagnostic procedures and medical or surgical treatment.     I hereby authorize [facility] to retain or dispose of any specimens or tissue, should there be such remaining from any test or procedure.     I hereby authorize and give consent for [facility] providers and employees to take photographs, images or videotapes of such diagnostic, surgical or treatment procedures of Patient as may be required by [facility] or as may be ordered by a physician. I further acknowledge and agree that [facility] may use cameras or other devices for patient monitoring.     I am aware that the practice of medicine is not an exact science, and I acknowledge that no guarantees have been made to me as to the outcome of any tests, procedures or treatment.     Authorization for Release of Information: I understand that my insurance company and/or their agents may need information necessary to make determinations about payment/reimbursement. I hereby provide authorization to release to all insurance companies, their successors, assignees, other parties with whom they may have contracted, or others acting on their behalf, that are involved with payment for any hospital and/or clinic charges incurred by the patient, any information that they request and deem necessary for payment/reimbursement, and/or quality review.  I further authorize the release of my health information to physicians or other health care practitioners on staff who are involved in my health care now and in the future, and to other health care providers, entities, or institutions for the purpose of my continued care and treatment, including referrals.     REGISTRATION AUTHORIZATION  Form No.  20225 (Rev. 3/25/2024)    Page 1 of 3                       Medicare Patient's Certification and Authorization to Release Information and Payment Request:  I certify that the information given by me in applying for payment under Title XVIII of the Social Security Act is correct. I authorize any mariano of medical or other information about me to release to the Social SecurityAdministration, or its intermediaries or carriers, any information needed for this or a related Medicare claim. I request that payment of authorized benefits be made on my behalf.     Assignment of Insurance Benefits:   I hereby authorize any and all insurance companies, health plans, defined   benefit plans, health insurers or any entity that is or may be responsible for payment of my medical expenses to pay all hospital and medical benefits now due, and to become due and payable to me under any hospital benefits, sick benefits, injury benefits or any other benefit for services rendered to me, including Major Medical Benefits, direct to [facility] and all independently contracted physicians. I assign any and all rights that I may have against any and all insurance companies, health plans, defined benefit plans, health insurers or any entity that is or may be responsible for payment of my medical expenses, including, but not limited to any right to appeal a denial of a claim, any right to bring any action, lawsuit, administrative proceeding, or other cause of action on my behalf. I specifically assign my right to pursue litigation against any and all insurance companies, health plans, defined benefit plans, health insurers or any entity that is or may be responsible for payment of my medical expenses based upon a refusal to pay charges.            E. Valuables: It is understood and agreed that [facility] is not liable for the damage to or loss of any money, jewelry,   documents, dentures, eye glasses, hearing aids, prosthetics, or other property of  value.     F. Computer Equipment: I understand and agree that should I choose to use computer equipment owned by [facility] or if I choose to access the Internet via [facility]s network, I do so at my own risk. [facility] is not responsible for any damage to my computer equipment or to any damages of any type that might arise from my loss of equipment or data.     G. Acceptance of Financial Responsibility:  I agree that in consideration of the services and   supplies that have been   or will be furnished to the patient, I am hereby obligated to pay all charges made for or on the account of the patient according to the standard rates (in effect at the time the services and supplies are delivered) established by [facility], including its Patient Financial Assistance Policy to the extent it is applicable. I understand that I am responsible for all charges, or portions thereof, not covered by insurance or other sources. Patient refunds will be distributed only after balances at all [facility] facilities are paid.     H. Communication Authorization:  I hereby authorize [facility] and its representatives, along with any billing service   or  who may work on their behalf, to contact me on   my cell phone and/or home phone using pre- recorded messages, artificial voice messages, automatic telephone dialing devices or other computer assisted technology, or by electronic      mail, text messaging, or by any other form of electronic communication. This includes, but is not limited to, appointment reminders, yearly physical exam reminders, preventive care reminders, patient campaigns, welcome calls, and calls about account balances on my account or any account on which I am listed as a guarantor. I understand I have the right to opt out of these communications at any time.      Relationship  Between  Facility and  Provider:      I understand that some, but not all, providers furnishing services to the patient are not employees  or agents of [facility]. The patient is under the care and supervision of his/her attending physician, and it is the responsibility of the facility and its nursing staff to carry out the instructions of such physicians. It is the responsibility of the patient's physician/designee to obtain the patient's informed consent, when required, for medical or surgical treatment, special diagnostic or therapeutic procedures, or hospital services rendered for the patient under the special instructions of the physician/designee.           REGISTRATION AUTHORIZATION  Form No. 41580 (Rev. 3/25/2024)    Page 2 of 3                       Immunizations: [facility] shares immunization information with state sponsored health departments to help you and your doctor keep track of your immunization records. By signing, you consent to have this information shared with the health department in your state:                                [place] - LINKS ([place] Immunization Network for Kids Statewide)                                [place] - MIIX ([place] Immunization Information eXchange)                                [place] - ImmPRINT (Immunization Patient Registry with Integrated Technology)     TERM: This authorization is valid for this and subsequent care/treatment I receive at [facility] and will remain valid unless/until revoked in writing by me.     [facility]: As used in this document, [facility] means all [facility] owned and managed facilities, including, but not limited to, all health centers, surgery centers, clinics, urgent care centers, and hospitals.         [facility] complies with applicable Federal civil rights laws and does not discriminate on the basis of race, color, national origin, age, disability, or sex.  ATENCIÓN: si habla nimo, tiene a correa disposición servicios gratuitos de asistencia lingüística. Merary spangler [phone].  [facility] Ý: N?u b?n nói Ti?ng Vi?t, có các d?ch v? h? tr? ngôn ng?  mi?n phí dành cho b?n. G?i s? [phone].        REGISTRATION AUTHORIZATION  Form No. 29938 (Rev. 3/25/2024)   Page 3 of 3     Patient      On Close send to:

## 2025-03-17 ENCOUNTER — RESULTS FOLLOW-UP (OUTPATIENT)
Dept: FAMILY MEDICINE | Facility: CLINIC | Age: 75
End: 2025-03-17

## 2025-03-21 ENCOUNTER — TELEPHONE (OUTPATIENT)
Dept: FAMILY MEDICINE | Facility: CLINIC | Age: 75
End: 2025-03-21
Payer: MEDICARE

## 2025-03-21 DIAGNOSIS — Z79.4 TYPE 2 DIABETES MELLITUS WITH OTHER SPECIFIED COMPLICATION, WITH LONG-TERM CURRENT USE OF INSULIN: Chronic | ICD-10-CM

## 2025-03-21 DIAGNOSIS — E11.69 TYPE 2 DIABETES MELLITUS WITH OTHER SPECIFIED COMPLICATION, WITH LONG-TERM CURRENT USE OF INSULIN: Chronic | ICD-10-CM

## 2025-03-21 RX ORDER — GLIPIZIDE 10 MG/1
10 TABLET ORAL
Qty: 180 TABLET | Refills: 1 | Status: SHIPPED | OUTPATIENT
Start: 2025-03-21 | End: 2026-03-21

## 2025-03-21 NOTE — TELEPHONE ENCOUNTER
Attempted to contact pt in regards to lab results. Pt was not available, was able to leave message for patient to return phone call. Medication ordered.     ----- Message from Nurse Mustafa sent at 3/20/2025  4:46 PM CDT -----    ----- Message -----  From: Tracy Luque FNP  Sent: 3/17/2025   8:47 AM CDT  To: Ramsey Irons, LPN    Creatinine elevated. Stay hydrated. Avoid nsaids.  Hgba1c elevated. Diabetes poorly controlled. Increase glipizide to 10mg bid. Diabetic diet. F/u visit in 3 month  Anemia noted but stable  ----- Message -----  From: Lab, Background User  Sent: 3/11/2025   5:19 PM CDT  To: URSZULA Teixeira

## 2025-03-22 DIAGNOSIS — I10 ESSENTIAL HYPERTENSION: ICD-10-CM

## 2025-03-22 DIAGNOSIS — E78.2 MIXED HYPERLIPIDEMIA: ICD-10-CM

## 2025-03-24 DIAGNOSIS — I10 ESSENTIAL HYPERTENSION: ICD-10-CM

## 2025-03-24 DIAGNOSIS — E78.2 MIXED HYPERLIPIDEMIA: ICD-10-CM

## 2025-03-24 DIAGNOSIS — Z79.4 TYPE 2 DIABETES MELLITUS WITH OTHER SPECIFIED COMPLICATION, WITH LONG-TERM CURRENT USE OF INSULIN: ICD-10-CM

## 2025-03-24 DIAGNOSIS — E11.69 TYPE 2 DIABETES MELLITUS WITH OTHER SPECIFIED COMPLICATION, WITH LONG-TERM CURRENT USE OF INSULIN: ICD-10-CM

## 2025-03-24 RX ORDER — CANDESARTAN CILEXETIL AND HYDROCHLOROTHIAZIDE 32; 25 MG/1; MG/1
1 TABLET ORAL
Qty: 90 EACH | Refills: 1 | Status: SHIPPED | OUTPATIENT
Start: 2025-03-24

## 2025-03-24 RX ORDER — PEN NEEDLE, DIABETIC 31 GX5/16"
NEEDLE, DISPOSABLE MISCELLANEOUS
Qty: 100 EACH | Refills: 5 | Status: SHIPPED | OUTPATIENT
Start: 2025-03-24

## 2025-03-24 RX ORDER — ROSUVASTATIN CALCIUM 10 MG/1
10 TABLET, COATED ORAL NIGHTLY
Qty: 90 TABLET | Refills: 1 | OUTPATIENT
Start: 2025-03-24

## 2025-03-24 RX ORDER — CANDESARTAN CILEXETIL AND HYDROCHLOROTHIAZIDE 32; 25 MG/1; MG/1
1 TABLET ORAL DAILY
Qty: 90 EACH | Refills: 1 | OUTPATIENT
Start: 2025-03-24

## 2025-03-24 RX ORDER — ROSUVASTATIN CALCIUM 10 MG/1
10 TABLET, COATED ORAL NIGHTLY
Qty: 90 TABLET | Refills: 1 | Status: SHIPPED | OUTPATIENT
Start: 2025-03-24

## 2025-04-02 ENCOUNTER — TELEPHONE (OUTPATIENT)
Dept: FAMILY MEDICINE | Facility: CLINIC | Age: 75
End: 2025-04-02
Payer: MEDICARE

## 2025-04-02 NOTE — TELEPHONE ENCOUNTER
Attempted to contact patient in regards to BP. Pt was not available, was able to leave message for patient to return phone call.

## 2025-04-14 ENCOUNTER — TELEPHONE (OUTPATIENT)
Dept: FAMILY MEDICINE | Facility: CLINIC | Age: 75
End: 2025-04-14
Payer: MEDICARE

## 2025-04-14 NOTE — TELEPHONE ENCOUNTER
----- Message from Tracy sent at 4/14/2025 11:48 AM CDT -----  Regarding: CALL BACK  PT IS REQUESTING A CALL BACK ABOUT HER LEGS AND THIGHS FEELING HEAVY. PT WANTS TO KNOW IF SHE NEEDS TO BE REFERRED TO SOMEONE ELSE OR COME IN TO CLINIC TO BE SEEN. PT CAN BE REACHED [phone].

## 2025-04-16 ENCOUNTER — OFFICE VISIT (OUTPATIENT)
Dept: FAMILY MEDICINE | Facility: CLINIC | Age: 75
End: 2025-04-16
Payer: MEDICARE

## 2025-04-16 ENCOUNTER — HOSPITAL ENCOUNTER (OUTPATIENT)
Dept: RADIOLOGY | Facility: HOSPITAL | Age: 75
Discharge: HOME | End: 2025-04-16
Attending: FAMILY MEDICINE
Payer: MEDICARE

## 2025-04-16 VITALS
BODY MASS INDEX: 29.03 KG/M2 | SYSTOLIC BLOOD PRESSURE: 148 MMHG | RESPIRATION RATE: 16 BRPM | HEART RATE: 83 BPM | WEIGHT: 185 LBS | HEIGHT: 67 IN | OXYGEN SATURATION: 98 % | TEMPERATURE: 99 F | DIASTOLIC BLOOD PRESSURE: 86 MMHG

## 2025-04-16 DIAGNOSIS — I10 ESSENTIAL HYPERTENSION: ICD-10-CM

## 2025-04-16 DIAGNOSIS — G89.29 CHRONIC BILATERAL LOW BACK PAIN WITH BILATERAL SCIATICA: Primary | ICD-10-CM

## 2025-04-16 DIAGNOSIS — E11.69 TYPE 2 DIABETES MELLITUS WITH OTHER SPECIFIED COMPLICATION, WITH LONG-TERM CURRENT USE OF INSULIN: ICD-10-CM

## 2025-04-16 DIAGNOSIS — J30.2 SEASONAL ALLERGIES: ICD-10-CM

## 2025-04-16 DIAGNOSIS — G89.29 CHRONIC RIGHT SHOULDER PAIN: ICD-10-CM

## 2025-04-16 DIAGNOSIS — M54.41 CHRONIC BILATERAL LOW BACK PAIN WITH BILATERAL SCIATICA: Primary | ICD-10-CM

## 2025-04-16 DIAGNOSIS — G89.29 CHRONIC BILATERAL LOW BACK PAIN WITH BILATERAL SCIATICA: ICD-10-CM

## 2025-04-16 DIAGNOSIS — M54.41 CHRONIC BILATERAL LOW BACK PAIN WITH BILATERAL SCIATICA: ICD-10-CM

## 2025-04-16 DIAGNOSIS — M54.42 CHRONIC BILATERAL LOW BACK PAIN WITH BILATERAL SCIATICA: ICD-10-CM

## 2025-04-16 DIAGNOSIS — Z79.4 TYPE 2 DIABETES MELLITUS WITH OTHER SPECIFIED COMPLICATION, WITH LONG-TERM CURRENT USE OF INSULIN: ICD-10-CM

## 2025-04-16 DIAGNOSIS — E78.2 MIXED HYPERLIPIDEMIA: ICD-10-CM

## 2025-04-16 DIAGNOSIS — M54.42 CHRONIC BILATERAL LOW BACK PAIN WITH BILATERAL SCIATICA: Primary | ICD-10-CM

## 2025-04-16 DIAGNOSIS — M25.511 CHRONIC RIGHT SHOULDER PAIN: ICD-10-CM

## 2025-04-16 PROCEDURE — 1101F PT FALLS ASSESS-DOCD LE1/YR: CPT | Mod: ,,, | Performed by: FAMILY MEDICINE

## 2025-04-16 PROCEDURE — 1125F AMNT PAIN NOTED PAIN PRSNT: CPT | Mod: ,,, | Performed by: FAMILY MEDICINE

## 2025-04-16 PROCEDURE — 3288F FALL RISK ASSESSMENT DOCD: CPT | Mod: ,,, | Performed by: FAMILY MEDICINE

## 2025-04-16 PROCEDURE — 72100 X-RAY EXAM L-S SPINE 2/3 VWS: CPT | Mod: TC

## 2025-04-16 PROCEDURE — 1159F MED LIST DOCD IN RCRD: CPT | Mod: ,,, | Performed by: FAMILY MEDICINE

## 2025-04-16 PROCEDURE — 99213 OFFICE O/P EST LOW 20 MIN: CPT | Mod: ,,, | Performed by: FAMILY MEDICINE

## 2025-04-16 PROCEDURE — 3079F DIAST BP 80-89 MM HG: CPT | Mod: ,,, | Performed by: FAMILY MEDICINE

## 2025-04-16 PROCEDURE — 3077F SYST BP >= 140 MM HG: CPT | Mod: ,,, | Performed by: FAMILY MEDICINE

## 2025-04-16 RX ORDER — CANDESARTAN CILEXETIL AND HYDROCHLOROTHIAZIDE 32; 25 MG/1; MG/1
1 TABLET ORAL DAILY
Qty: 90 EACH | Refills: 1 | Status: SHIPPED | OUTPATIENT
Start: 2025-04-16

## 2025-04-16 RX ORDER — GLIPIZIDE 10 MG/1
10 TABLET ORAL
Qty: 180 TABLET | Refills: 1 | Status: SHIPPED | OUTPATIENT
Start: 2025-04-16 | End: 2026-04-16

## 2025-04-16 RX ORDER — SEMAGLUTIDE 0.68 MG/ML
0.5 INJECTION, SOLUTION SUBCUTANEOUS
Qty: 9 ML | Refills: 1 | Status: SHIPPED | OUTPATIENT
Start: 2025-04-16

## 2025-04-16 RX ORDER — MONTELUKAST SODIUM 10 MG/1
10 TABLET ORAL DAILY
Qty: 90 TABLET | Refills: 1 | Status: SHIPPED | OUTPATIENT
Start: 2025-04-16

## 2025-04-16 RX ORDER — INSULIN DEGLUDEC 200 U/ML
56 INJECTION, SOLUTION SUBCUTANEOUS DAILY
Qty: 9 PEN | Refills: 3 | Status: SHIPPED | OUTPATIENT
Start: 2025-04-16

## 2025-04-16 RX ORDER — FEXOFENADINE HCL 60 MG/1
60 TABLET, FILM COATED ORAL DAILY PRN
Qty: 90 TABLET | Refills: 1 | Status: SHIPPED | OUTPATIENT
Start: 2025-04-16 | End: 2026-04-16

## 2025-04-16 RX ORDER — ROSUVASTATIN CALCIUM 10 MG/1
10 TABLET, COATED ORAL NIGHTLY
Qty: 90 TABLET | Refills: 1 | Status: SHIPPED | OUTPATIENT
Start: 2025-04-16

## 2025-04-16 NOTE — PROGRESS NOTES
"Clinic Note    Patient Name: Luna Wolf  : 1950  MRN: [id number]    Chief Complaint   Patient presents with    Referral     Pt reports both of her legs "feel heavy" and she is experiencing numbness. Requesting referral.     Health Maintenance     Diabetic Eye Exam Never done  TETANUS VACCINE Never done  Shingles Vaccine(1 of 2) Never done  Pneumococcal Vaccines (Age 50+)(2 of 2 - PCV) due on 2015  RSV Vaccine (Age 60+ and Pregnant patients)(1 - 1-dose 75+ series) Never done  COVID-19 Vaccine( - - season) due on 2024        HPI:    Ms. Luna Wolf is a 74 y.o. female who presents to clinic today with CC of pain in bilateral legs. Requesting referral.   Patient reports low back pain that is worse with standing long periods of time. Reports associated aching pain in bilateral legs. Reports pain is not specifically in any joint such as hip or knee. Reports pain is worse after sitting for a period of time. Reports legs feel stiff. Reports legs feel "heavy". Denies associated numbness or tingling. Denies leg cramps. Reports she has tried some OTC "muscle rub" which seemed to relax her legs some. Denies swelling in legs.   Patient reports issues with seasonal allergies since the pollen has been bad. Reports she is currently taking Singulair without adequate symptom control. Reports she is also taking OTC zyrtec without improvement in symptoms.   BP is elevated today. She reports she has not had her medication yet this morning. Reports BP is typically well controlled on current medication regimen.   Patient is, otherwise, without complaints.     Medications:  Medication List with Changes/Refills   New Medications    FEXOFENADINE (ALLEGRA ALLERGY) 60 MG TABLET    Take 1 tablet (60 mg total) by mouth daily as needed (allergies).   Current Medications    ACCU-CHEK GAY PLUS TEST STRP STRP    For home blood glucose monitoring three times daily. Dx: Type II DM E11.9    ASPIRIN (ECOTRIN) 81 MG EC " "TABLET    Take 81 mg by mouth once daily.    BD ULTRA-FINE MINI PEN NEEDLE 31 GAUGE X 3/16" NDLE    USE AS DIRECTED WITH [facility]    BLOOD SUGAR DIAGNOSTIC STRP    For 3 times daily home blood glucose monitoring Dx: Type II DM with hyperglycemia Insulin Dependent E11.65    BLOOD SUGAR DIAGNOSTIC STRP    To check BG 1 times daily, to use with insurance preferred meter    BLOOD-GLUCOSE METER KIT    For 3 times daily home blood glucose monitoring Dx: Type II DM with hyperglycemia Insulin Dependent E11.65    FLUTICASONE PROPIONATE (FLONASE) 50 MCG/ACTUATION NASAL SPRAY    1 spray (50 mcg total) by Each Nostril route 2 (two) times daily as needed for Rhinitis.   Changed and/or Refilled Medications    Modified Medication Previous Medication    ATACAND HCT 32-25 MG TAB ATACAND HCT 32-25 mg Tab       Take 1 tablet by mouth once daily.    TAKE 1 TABLET BY MOUTH DAILY    GLIPIZIDE (GLUCOTROL) 10 MG TABLET glipiZIDE (GLUCOTROL) 10 MG tablet       Take 1 tablet (10 mg total) by mouth 2 (two) times daily before meals.    Take 1 tablet (10 mg total) by mouth 2 (two) times daily before meals.    MONTELUKAST (SINGULAIR) 10 MG TABLET montelukast (SINGULAIR) 10 mg tablet       Take 1 tablet (10 mg total) by mouth once daily.    Take 1 tablet (10 mg total) by mouth once daily.    ROSUVASTATIN (CRESTOR) 10 MG TABLET rosuvastatin (CRESTOR) 10 MG tablet       Take 1 tablet (10 mg total) by mouth every evening.    TAKE 1 TABLET(10 MG) BY MOUTH EVERY EVENING    SEMAGLUTIDE (OZEMPIC) 0.25 MG OR 0.5 MG (2 MG/3 ML) PEN INJECTOR semaglutide (OZEMPIC) 0.25 mg or 0.5 mg (2 mg/3 mL) pen injector       Inject 0.5 mg into the skin every 7 days.    Inject 0.5 mg into the skin every 7 days.    TRESIBA FLEXTOUCH U-200 200 UNIT/ML (3 ML) INSULIN PEN TRESIBA FLEXTOUCH U-200 200 unit/mL (3 mL) insulin pen       Inject 56 Units into the skin once daily.    Inject 56 Units into the skin once daily.        Allergies: Amlodipine, Candesartan, Ibuprofen, " "Pravastatin, and Metformin      Past Medical History:    Past Medical History:   Diagnosis Date    Breast cancer in female     Cancer     Diabetes mellitus     Hypertension     Mixed hyperlipidemia        Past Surgical History:    Past Surgical History:   Procedure Laterality Date    BREAST BIOPSY      CERVICAL DISC SURGERY      TUBAL LIGATION           Social History:    Tobacco Use History[1]  Social History     Substance and Sexual Activity   Alcohol Use Never     Social History     Substance and Sexual Activity   Drug Use Never         Family History:    Family History   Problem Relation Name Age of Onset    Hypertension Brother      Diabetes Brother         Review of Systems:    Review of Systems   Constitutional:  Negative for appetite change, chills, fatigue, fever and unexpected weight change.   HENT:  Positive for nasal congestion and sneezing.    Eyes:  Negative for visual disturbance.   Respiratory:  Negative for cough and shortness of breath.    Cardiovascular:  Negative for chest pain and leg swelling.   Gastrointestinal:  Negative for abdominal pain, change in bowel habit, constipation, diarrhea, nausea and vomiting.   Musculoskeletal:  Positive for arthralgias and back pain.   Integumentary:  Negative for rash.   Neurological:  Negative for dizziness and headaches.   Psychiatric/Behavioral:  The patient is not nervous/anxious.         Vitals:    Vitals:    04/16/25 0926 04/16/25 1023   BP: (!) 142/78 (!) 148/86   BP Location: Left arm    Patient Position: Sitting    Pulse: 83    Resp: 16    Temp: 98.5 °F (36.9 °C)    TempSrc: Oral    SpO2: 98%    Weight: 83.9 kg (185 lb)    Height: 5' 7" (1.702 m)        Body mass index is 28.98 kg/m².    Wt Readings from Last 3 Encounters:   04/16/25 0926 83.9 kg (185 lb)   03/11/25 0932 83.5 kg (184 lb)   12/26/24 1314 83.2 kg (183 lb 6.4 oz)        Physical Exam:    Physical Exam  Constitutional:       General: She is not in acute distress.     Appearance: Normal " appearance.   HENT:      Nose: Nose normal.      Mouth/Throat:      Mouth: Mucous membranes are moist.      Pharynx: Oropharynx is clear.   Eyes:      Conjunctiva/sclera: Conjunctivae normal.   Cardiovascular:      Rate and Rhythm: Normal rate and regular rhythm.      Heart sounds: Normal heart sounds. No murmur heard.  Pulmonary:      Effort: Pulmonary effort is normal. No respiratory distress.      Breath sounds: Normal breath sounds. No wheezing, rhonchi or rales.   Abdominal:      General: Bowel sounds are normal.      Palpations: Abdomen is soft.      Tenderness: There is no abdominal tenderness.   Musculoskeletal:         General: No swelling or tenderness. Normal range of motion.      Cervical back: Neck supple.      Right lower leg: No edema.      Left lower leg: No edema.   Skin:     Findings: No rash.   Neurological:      Mental Status: She is alert. Mental status is at baseline.      Gait: Gait abnormal.      Comments: + ambulates with cane   Psychiatric:         Mood and Affect: Mood normal.         Results:    Assessment/Plan:   1. Chronic bilateral low back pain with bilateral sciatica  -     X-Ray Lumbar Spine AP And Lateral; Future; Expected date: 04/16/2025  -     Ambulatory referral/consult to Pain Clinic; Future; Expected date: 04/23/2025  -     Ambulatory Referral/Consult to Physical Therapy; Future; Expected date: 04/23/2025    2. Seasonal allergies  -     montelukast (SINGULAIR) 10 mg tablet; Take 1 tablet (10 mg total) by mouth once daily.  Dispense: 90 tablet; Refill: 1  -     fexofenadine (ALLEGRA ALLERGY) 60 MG tablet; Take 1 tablet (60 mg total) by mouth daily as needed (allergies).  Dispense: 90 tablet; Refill: 1- new medication. Risks/benefits/potential side effects/black box warning reviewed and discussed with patient.     3. Mixed hyperlipidemia  -     rosuvastatin (CRESTOR) 10 MG tablet; Take 1 tablet (10 mg total) by mouth every evening.  Dispense: 90 tablet; Refill: 1    4. Type 2  diabetes mellitus with other specified complication, with long-term current use of insulin  -     glipiZIDE (GLUCOTROL) 10 MG tablet; Take 1 tablet (10 mg total) by mouth 2 (two) times daily before meals.  Dispense: 180 tablet; Refill: 1  -     TRESIBA FLEXTOUCH U-200 200 unit/mL (3 mL) insulin pen; Inject 56 Units into the skin once daily.  Dispense: 9 Pen; Refill: 3  -     semaglutide (OZEMPIC) 0.25 mg or 0.5 mg (2 mg/3 mL) pen injector; Inject 0.5 mg into the skin every 7 days.  Dispense: 9 mL; Refill: 1    5. Essential hypertension  -     ATACAND HCT 32-25 mg Tab; Take 1 tablet by mouth once daily.  Dispense: 90 each; Refill: 1  - BP is elevated in clinic today. Patient reports normal readings at home. Will have nurse follow up with phone call with home BP reading in 1-2 weeks. If remains elevated patient will need to RTC for medication adjustments.  - DASH diet and exercise  - Encouraged medication compliance  - Continue home blood pressure monitoring. Call/RTC for persistently elevated readings.    6. Chronic right shoulder pain  -     Ambulatory Referral/Consult to Physical Therapy; Future; Expected date: 04/23/2025         Active Problem List with Overview Notes    Diagnosis Date Noted    Stage 3b chronic kidney disease 03/01/2024    Type 2 diabetes mellitus, with long-term current use of insulin 08/28/2023    Essential hypertension 08/28/2023    Mixed hyperlipidemia 08/28/2023    Cervical spinal stenosis 08/28/2023     S/p surgery with Dr. Wagoner (Neurosugery) in [place], [place]      Hx of colonic polyps 12/28/2022    Diverticulosis of colon 12/28/2022      Diabetic eye exam - [facility] - Dr. Mauricio    RTC as scheduled for chronic follow up. RTC sooner if needed.  Patient voiced understanding and is agreeable to plan.      Kerri Mcdaniels MD    Family Medicine           [1]   Social History  Tobacco Use   Smoking Status Never    Passive exposure: Never   Smokeless Tobacco Never

## 2025-04-17 ENCOUNTER — PATIENT OUTREACH (OUTPATIENT)
Facility: HOSPITAL | Age: 75
End: 2025-04-17
Payer: MEDICARE

## 2025-04-17 NOTE — LETTER
AUTHORIZATION FOR RELEASE OF   CONFIDENTIAL INFORMATION    Dear [facility],    We are seeing Luna Wolf, date of birth 1950, in the clinic at [facility]. Sushila Mcdaniels MD is the patient's PCP. Luna Wolf has an outstanding lab/procedure at the time we reviewed her chart. In order to help keep her health information updated, she has authorized us to request the following medical record(s):        (  )  MAMMOGRAM                                      (  )  COLONOSCOPY      (  )  PAP SMEAR                                          (  )  OUTSIDE LAB RESULTS     (  )  DEXA SCAN                                          ( x )  EYE EXAM            (  )  FOOT EXAM                                          (  )  ENTIRE RECORD     (  )  OUTSIDE IMMUNIZATIONS                 (  )  _______________         Please fax records to [facility], Mallary Harvey, LPN Care Coordinator, [phone].      If you have any questions, please contact Lizz at [phone]. .           Patient Name: Luna Wolf  : 1950  Patient Phone #: [phone]                Luna Wolf  MRN: [id number]  : 1950  Age: 74 y.o.  Sex: female         Patient/Legal Guardian Signature  This signature was collected at 2025    SELF       _______________________________   Printed Name/Relationship to Patient      Consent for Examination and Treatment: I hereby authorize the providers and employees of [facility] ([facility]) to provide medical treatment/services which includes, but is not limited to, performing and administering tests and diagnostic procedures that are deemed necessary, including, but not limited to, imaging examinations, blood tests and other laboratory procedures as may be required by the hospital, clinic, or may be ordered by my physician(s) or persons working under the general and/or special instructions of my physician(s).      I understand and agree that this consent  covers all authorized persons, including but not limited to physicians, residents, nurse practitioners, physicians' assistants, specialists, consultants, student nurses, and independently contracted physicians, who are called upon by the physician in charge, to carry out the diagnostic procedures and medical or surgical treatment.     I hereby authorize [facility] to retain or dispose of any specimens or tissue, should there be such remaining from any test or procedure.     I hereby authorize and give consent for [facility] providers and employees to take photographs, images or videotapes of such diagnostic, surgical or treatment procedures of Patient as may be required by [facility] or as may be ordered by a physician. I further acknowledge and agree that [facility] may use cameras or other devices for patient monitoring.     I am aware that the practice of medicine is not an exact science, and I acknowledge that no guarantees have been made to me as to the outcome of any tests, procedures or treatment.     Authorization for Release of Information: I understand that my insurance company and/or their agents may need information necessary to make determinations about payment/reimbursement. I hereby provide authorization to release to all insurance companies, their successors, assignees, other parties with whom they may have contracted, or others acting on their behalf, that are involved with payment for any hospital and/or clinic charges incurred by the patient, any information that they request and deem necessary for payment/reimbursement, and/or quality review.  I further authorize the release of my health information to physicians or other health care practitioners on staff who are involved in my health care now and in the future, and to other health care providers, entities, or institutions for the purpose of my continued care and treatment, including referrals.     REGISTRATION AUTHORIZATION  Form No. 94989 (Rev. 3/25/2024)     Page 1 of 3                       Medicare Patient's Certification and Authorization to Release Information and Payment Request:  I certify that the information given by me in applying for payment under Title XVIII of the Social Security Act is correct. I authorize any mariano of medical or other information about me to release to the Social SecurityAdministration, or its intermediaries or carriers, any information needed for this or a related Medicare claim. I request that payment of authorized benefits be made on my behalf.     Assignment of Insurance Benefits:   I hereby authorize any and all insurance companies, health plans, defined   benefit plans, health insurers or any entity that is or may be responsible for payment of my medical expenses to pay all hospital and medical benefits now due, and to become due and payable to me under any hospital benefits, sick benefits, injury benefits or any other benefit for services rendered to me, including Major Medical Benefits, direct to [facility] and all independently contracted physicians. I assign any and all rights that I may have against any and all insurance companies, health plans, defined benefit plans, health insurers or any entity that is or may be responsible for payment of my medical expenses, including, but not limited to any right to appeal a denial of a claim, any right to bring any action, lawsuit, administrative proceeding, or other cause of action on my behalf. I specifically assign my right to pursue litigation against any and all insurance companies, health plans, defined benefit plans, health insurers or any entity that is or may be responsible for payment of my medical expenses based upon a refusal to pay charges.            E. Valuables: It is understood and agreed that [facility] is not liable for the damage to or loss of any money, jewelry,   documents, dentures, eye glasses, hearing aids, prosthetics, or other property of value.     F. Computer  Equipment: I understand and agree that should I choose to use computer equipment owned by [facility] or if I choose to access the Internet via [facility]s network, I do so at my own risk. [facility] is not responsible for any damage to my computer equipment or to any damages of any type that might arise from my loss of equipment or data.     G. Acceptance of Financial Responsibility:  I agree that in consideration of the services and   supplies that have been   or will be furnished to the patient, I am hereby obligated to pay all charges made for or on the account of the patient according to the standard rates (in effect at the time the services and supplies are delivered) established by [facility], including its Patient Financial Assistance Policy to the extent it is applicable. I understand that I am responsible for all charges, or portions thereof, not covered by insurance or other sources. Patient refunds will be distributed only after balances at all [facility] facilities are paid.     H. Communication Authorization:  I hereby authorize [facility] and its representatives, along with any billing service   or  who may work on their behalf, to contact me on   my cell phone and/or home phone using pre- recorded messages, artificial voice messages, automatic telephone dialing devices or other computer assisted technology, or by electronic      mail, text messaging, or by any other form of electronic communication. This includes, but is not limited to, appointment reminders, yearly physical exam reminders, preventive care reminders, patient campaigns, welcome calls, and calls about account balances on my account or any account on which I am listed as a guarantor. I understand I have the right to opt out of these communications at any time.      Relationship  Between  Facility and  Provider:      I understand that some, but not all, providers furnishing services to the patient are not employees or agents of [facility].  The patient is under the care and supervision of his/her attending physician, and it is the responsibility of the facility and its nursing staff to carry out the instructions of such physicians. It is the responsibility of the patient's physician/designee to obtain the patient's informed consent, when required, for medical or surgical treatment, special diagnostic or therapeutic procedures, or hospital services rendered for the patient under the special instructions of the physician/designee.           REGISTRATION AUTHORIZATION  Form No. 41082 (Rev. 3/25/2024)    Page 2 of 3                       Immunizations: [facility] shares immunization information with state sponsored health departments to help you and your doctor keep track of your immunization records. By signing, you consent to have this information shared with the health department in your state:                                [place] - LINKS ([place] Immunization Network for Kids Statewide)                                [place] - MIIX ([place] Immunization Information eXchange)                                [place] - ImmPRINT (Immunization Patient Registry with Integrated Technology)     TERM: This authorization is valid for this and subsequent care/treatment I receive at [facility] and will remain valid unless/until revoked in writing by me.     [facility]: As used in this document, [facility] means all [facility] owned and managed facilities, including, but not limited to, all health centers, surgery centers, clinics, urgent care centers, and hospitals.         [facility] complies with applicable Federal civil rights laws and does not discriminate on the basis of race, color, national origin, age, disability, or sex.  ATENCIÓN: si habla español, tiene a correa disposición servicios gratuitos de asistencia lingüística. Merary spangler [phone].  MARCOS Ý: N?u b?n nói Ti?ng Vi?t, có các d?ch v? h? tr? ngôn ng? mi?n phí dành cho  b?n. G?i s? [phone].        REGISTRATION AUTHORIZATION  Form No. 59746 (Rev. 3/25/2024)   Page 3 of 3

## 2025-04-17 NOTE — PROGRESS NOTES
Population Health Chart Review & Patient Outreach Details    Updates Requested / Reviewed:  [x]  Care Team Updated      Health Maintenance Topics Addressed and Outreach Outcomes / Actions Taken:  Diabetic Eye Exam [x] MAI faxed to [facility] for eye exam.

## 2025-04-22 ENCOUNTER — HOSPITAL ENCOUNTER (EMERGENCY)
Facility: HOSPITAL | Age: 75
Discharge: HOME | End: 2025-04-22
Payer: MEDICARE

## 2025-04-22 VITALS
SYSTOLIC BLOOD PRESSURE: 178 MMHG | RESPIRATION RATE: 16 BRPM | BODY MASS INDEX: 29.03 KG/M2 | HEIGHT: 67 IN | HEART RATE: 99 BPM | DIASTOLIC BLOOD PRESSURE: 76 MMHG | TEMPERATURE: 99 F | OXYGEN SATURATION: 98 % | WEIGHT: 185 LBS

## 2025-04-22 DIAGNOSIS — S42.391A OTHER CLOSED FRACTURE OF SHAFT OF RIGHT HUMERUS, INITIAL ENCOUNTER: Primary | ICD-10-CM
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
## 2025-04-22 DIAGNOSIS — S49.90XA ARM INJURY: ICD-10-CM

## 2025-04-22 LAB
ANION GAP SERPL CALCULATED.3IONS-SCNC: 15 MMOL/L (ref 7–16)
APTT PPP: 21 SECONDS (ref 25.2–37.3)
BASOPHILS # BLD AUTO: 0.04 K/UL (ref 0–0.2)
BASOPHILS NFR BLD AUTO: 0.3 % (ref 0–1)
BUN SERPL-MCNC: 26 MG/DL (ref 10–20)
BUN/CREAT SERPL: 16 (ref 6–20)
CALCIUM SERPL-MCNC: 10.2 MG/DL (ref 8.4–10.2)
CHLORIDE SERPL-SCNC: 103 MMOL/L (ref 98–107)
CO2 SERPL-SCNC: 24 MMOL/L (ref 23–31)
CREAT SERPL-MCNC: 1.6 MG/DL (ref 0.55–1.02)
DIFFERENTIAL METHOD BLD: ABNORMAL
EGFR (NO RACE VARIABLE) (RUSH/TITUS): 33 ML/MIN/1.73M2
EOSINOPHIL # BLD AUTO: 0.06 K/UL (ref 0–0.5)
EOSINOPHIL NFR BLD AUTO: 0.5 % (ref 1–4)
ERYTHROCYTE [DISTWIDTH] IN BLOOD BY AUTOMATED COUNT: 14.1 % (ref 11.5–14.5)
GLUCOSE SERPL-MCNC: 310 MG/DL (ref 82–115)
HCT VFR BLD AUTO: 34.3 % (ref 38–47)
HGB BLD-MCNC: 10.6 G/DL (ref 12–16)
IMM GRANULOCYTES # BLD AUTO: 0.07 K/UL (ref 0–0.04)
IMM GRANULOCYTES NFR BLD: 0.5 % (ref 0–0.4)
INR BLD: 1.04
LYMPHOCYTES # BLD AUTO: 0.63 K/UL (ref 1–4.8)
LYMPHOCYTES NFR BLD AUTO: 4.8 % (ref 27–41)
MAGNESIUM SERPL-MCNC: 1.8 MG/DL (ref 1.6–2.6)
MCH RBC QN AUTO: 27.7 PG (ref 27–31)
MCHC RBC AUTO-ENTMCNC: 30.9 G/DL (ref 32–36)
MCV RBC AUTO: 89.8 FL (ref 80–96)
MONOCYTES # BLD AUTO: 0.7 K/UL (ref 0–0.8)
MONOCYTES NFR BLD AUTO: 5.4 % (ref 2–6)
MPC BLD CALC-MCNC: 10.7 FL (ref 9.4–12.4)
NEUTROPHILS # BLD AUTO: 11.56 K/UL (ref 1.8–7.7)
NEUTROPHILS NFR BLD AUTO: 88.5 % (ref 53–65)
NRBC # BLD AUTO: 0 X10E3/UL
NRBC, AUTO (.00): 0 %
OHS QRS DURATION: 100 MS
OHS QTC CALCULATION: 459 MS
PLATELET # BLD AUTO: 188 K/UL (ref 150–400)
POTASSIUM SERPL-SCNC: 4.4 MMOL/L (ref 3.5–5.1)
PROTHROMBIN TIME: 13.5 SECONDS (ref 11.7–14.7)
RBC # BLD AUTO: 3.82 M/UL (ref 4.2–5.4)
SODIUM SERPL-SCNC: 138 MMOL/L (ref 136–145)
TROPONIN I SERPL HS-MCNC: 11.3 NG/L
TROPONIN I SERPL HS-MCNC: 12 NG/L
WBC # BLD AUTO: 13.06 K/UL (ref 4.5–11)

## 2025-04-22 PROCEDURE — 85730 THROMBOPLASTIN TIME PARTIAL: CPT | Performed by: NURSE PRACTITIONER

## 2025-04-22 PROCEDURE — 99900035 HC TECH TIME PER 15 MIN (STAT)

## 2025-04-22 PROCEDURE — 85610 PROTHROMBIN TIME: CPT | Performed by: NURSE PRACTITIONER

## 2025-04-22 PROCEDURE — 93010 ELECTROCARDIOGRAM REPORT: CPT | Mod: ,,, | Performed by: INTERNAL MEDICINE

## 2025-04-22 PROCEDURE — 80048 BASIC METABOLIC PNL TOTAL CA: CPT | Performed by: NURSE PRACTITIONER

## 2025-04-22 PROCEDURE — 96375 TX/PRO/DX INJ NEW DRUG ADDON: CPT

## 2025-04-22 PROCEDURE — 83735 ASSAY OF MAGNESIUM: CPT | Performed by: NURSE PRACTITIONER

## 2025-04-22 PROCEDURE — 36415 COLL VENOUS BLD VENIPUNCTURE: CPT | Performed by: NURSE PRACTITIONER

## 2025-04-22 PROCEDURE — 93005 ELECTROCARDIOGRAM TRACING: CPT

## 2025-04-22 PROCEDURE — 85025 COMPLETE CBC W/AUTO DIFF WBC: CPT | Performed by: NURSE PRACTITIONER

## 2025-04-22 PROCEDURE — 99285 EMERGENCY DEPT VISIT HI MDM: CPT | Mod: 25

## 2025-04-22 PROCEDURE — 96361 HYDRATE IV INFUSION ADD-ON: CPT

## 2025-04-22 PROCEDURE — 84484 ASSAY OF TROPONIN QUANT: CPT | Performed by: NURSE PRACTITIONER

## 2025-04-22 PROCEDURE — 25000003 PHARM REV CODE 250: Performed by: NURSE PRACTITIONER

## 2025-04-22 PROCEDURE — 94761 N-INVAS EAR/PLS OXIMETRY MLT: CPT

## 2025-04-22 PROCEDURE — 63600175 PHARM REV CODE 636 W HCPCS: Performed by: NURSE PRACTITIONER

## 2025-04-22 PROCEDURE — 96374 THER/PROPH/DIAG INJ IV PUSH: CPT

## 2025-04-22 RX ORDER — MORPHINE SULFATE 2 MG/ML
2 INJECTION, SOLUTION INTRAMUSCULAR; INTRAVENOUS
Status: COMPLETED | OUTPATIENT
Start: 2025-04-22 | End: 2025-04-22

## 2025-04-22 RX ORDER — HYDROCODONE BITARTRATE AND ACETAMINOPHEN 5; 325 MG/1; MG/1
1 TABLET ORAL EVERY 6 HOURS PRN
Qty: 12 TABLET | Refills: 0 | Status: SHIPPED | OUTPATIENT
Start: 2025-04-22

## 2025-04-22 RX ORDER — ONDANSETRON HYDROCHLORIDE 2 MG/ML
4 INJECTION, SOLUTION INTRAVENOUS
Status: COMPLETED | OUTPATIENT
Start: 2025-04-22 | End: 2025-04-22

## 2025-04-22 RX ADMIN — SODIUM CHLORIDE 1000 ML: 9 INJECTION, SOLUTION INTRAVENOUS at 01:04

## 2025-04-22 RX ADMIN — ONDANSETRON 4 MG: 2 INJECTION INTRAMUSCULAR; INTRAVENOUS at 01:04

## 2025-04-22 RX ADMIN — MORPHINE SULFATE 2 MG: 2 INJECTION, SOLUTION INTRAMUSCULAR; INTRAVENOUS at 01:04

## 2025-04-22 NOTE — ED PROVIDER NOTES
"Encounter Date: 4/22/2025       History     Chief Complaint   Patient presents with    Fall     Presents to ED via EMS from home after fall this morning. Patient states she got "sick" in the middle of the night and got up to go to the bathroom and fell on a case of water. States she get sweaty while ambulating, denies chest pain or dizziness prior to falling, but states "I must have blacked out". C/o right shoulder and arm pain.      Shoulder Injury    Arm Pain     74-year-old female presents to the emergency department by EMS to be evaluated after she fell at home.  She said that she got up to go to the bathroom because she felt like she needed to have a bowel movement, then fell onto the floor and was not able to get up.  She said that she injured her right arm when she fell.  Denies head injury, headache, neck pain, back pain.  She said that she is not sure whether or not she lost consciousness.  Denies chest pain or shortness of breath.  She said that when she was able to get up to the bathroom, she passed a large amount of stool.  She said that she sat on the toilet for an extended amount of time because she "just kept going."     The history is provided by the patient.   Fall  The accident occurred just prior to arrival. Pertinent negatives include no neck pain, no back pain, no paresthesias, no paralysis, no visual change, no fever, no numbness, no abdominal pain, no bowel incontinence, no nausea, no vomiting, no hematuria, no headaches, no hearing loss, no loss of consciousness and no tingling.     Review of patient's allergies indicates:   Allergen Reactions    Amlodipine Other (See Comments)    Candesartan      Other Reaction(s): nausea/vomiting,     Pravastatin Other (See Comments)    Ibuprofen Nausea And Vomiting     Caused upset stomache    Metformin Nausea Only     Past Medical History:   Diagnosis Date    Breast cancer in female     Cancer     Diabetes mellitus     Hypertension     Mixed " hyperlipidemia      Past Surgical History:   Procedure Laterality Date    BREAST BIOPSY      CERVICAL DISC SURGERY      TUBAL LIGATION       Family History   Problem Relation Name Age of Onset    Hypertension Brother      Diabetes Brother       Social History[1]  Review of Systems   Constitutional:  Negative for fever.   Gastrointestinal:  Positive for diarrhea. Negative for abdominal pain, bowel incontinence, nausea and vomiting.   Genitourinary:  Negative for hematuria.   Musculoskeletal:  Negative for back pain and neck pain.   Neurological:  Negative for tingling, loss of consciousness, numbness, headaches and paresthesias.   All other systems reviewed and are negative.      Physical Exam     Initial Vitals [04/22/25 1035]   BP Pulse Resp Temp SpO2   (!) 180/96 83 15 98.6 °F (37 °C) 99 %      MAP       --         Physical Exam    Vitals reviewed.  Constitutional: She appears well-developed and well-nourished.   HENT:   Head: Normocephalic and atraumatic.   Eyes: EOM are normal. Pupils are equal, round, and reactive to light.   Neck: Neck supple.   Cardiovascular:  Normal rate and regular rhythm.           Pulmonary/Chest: Breath sounds normal.   Abdominal: Abdomen is soft. Bowel sounds are normal. She exhibits no distension and no mass. There is no abdominal tenderness. There is no rebound and no guarding.   Musculoskeletal:         General: Normal range of motion.      Right shoulder: Normal.      Right upper arm: Tenderness present. No swelling, edema, deformity or lacerations.      Right elbow: Normal.      Right forearm: Normal.      Right wrist: Normal.      Right hand: Normal.      Cervical back: Normal and neck supple.      Thoracic back: Normal.      Lumbar back: Normal.     Neurological: She is alert and oriented to person, place, and time. She has normal strength. GCS score is 15. GCS eye subscore is 4. GCS verbal subscore is 5. GCS motor subscore is 6.   Skin: Skin is warm and dry. Capillary refill  takes less than 2 seconds.   Psychiatric: She has a normal mood and affect.         Medical Screening Exam   See Full Note    ED Course   Procedures  Labs Reviewed   BASIC METABOLIC PANEL - Abnormal       Result Value    Sodium 138      Potassium 4.4      Chloride 103      CO2 24      Anion Gap 15      Glucose 310 (*)     BUN 26 (*)     Creatinine 1.60 (*)     BUN/Creatinine Ratio 16      Calcium 10.2      eGFR 33 (*)    APTT - Abnormal    PTT 21.0 (*)    CBC WITH DIFFERENTIAL - Abnormal    WBC 13.06 (*)     RBC 3.82 (*)     Hemoglobin 10.6 (*)     Hematocrit 34.3 (*)     MCV 89.8      MCH 27.7      MCHC 30.9 (*)     RDW 14.1      Platelet Count 188      MPV 10.7      Neutrophils % 88.5 (*)     Lymphocytes % 4.8 (*)     Monocytes % 5.4      Eosinophils % 0.5 (*)     Basophils % 0.3      Immature Granulocytes % 0.5 (*)     nRBC, Auto 0.0      Neutrophils, Abs 11.56 (*)     Lymphocytes, Absolute 0.63 (*)     Monocytes, Absolute 0.70      Eosinophils, Absolute 0.06      Basophils, Absolute 0.04      Immature Granulocytes, Absolute 0.07 (*)     nRBC, Absolute 0.00      Diff Type Auto     TROPONIN I - Normal    Troponin I High Sensitivity 12.0     PROTIME-INR - Normal    PT 13.5      INR 1.04     MAGNESIUM - Normal    Magnesium 1.8     TROPONIN I - Normal    Troponin I High Sensitivity 11.3     CBC W/ AUTO DIFFERENTIAL    Narrative:     The following orders were created for panel order CBC Auto Differential.  Procedure                               Abnormality         Status                     ---------                               -----------         ------                     CBC with Differential[[id number]]       Abnormal            Final result                 Please view results for these tests on the individual orders.        ECG Results              EKG 12-lead (Final result)        Collection Time Result Time QRS Duration OHS QTC Calculation    04/22/25 09:31:49 04/22/25 11:50:22 100 029                      Final result by Interface, [facility] (04/22/25 11:50:30)                   Narrative:    Test Reason : W19.XXXA,    Vent. Rate :  85 BPM     Atrial Rate :  85 BPM     P-R Int : 176 ms          QRS Dur : 100 ms      QT Int : 386 ms       P-R-T Axes :  50 -26   8 degrees    QTcB Int : 459 ms    Normal sinus rhythm  Possible Left atrial enlargement  LVH  Abnormal ECG  No previous ECGs available  Confirmed by Liam Santana ([id number]) on 4/22/2025 11:50:16 AM    Referred By: ZHANEERRAL SELF           Confirmed By: Rose Mariet Esther                                  Imaging Results              X-Ray Humerus 2 View Right (Final result)  Result time 04/22/25 11:25:28      Final result by Binh Camara MD (04/22/25 11:25:28)                   Impression:      As above.      Electronically signed by: Binh Camara MD  Date:    04/22/2025  Time:    11:25               Narrative:    EXAMINATION:  XR HUMERUS 2 VIEW RIGHT    CLINICAL HISTORY:  Unspecified injury of shoulder and upper arm, unspecified arm, initial encounter    TECHNIQUE:  AP and lateral views of right humerus.    COMPARISON:  None    FINDINGS:  Displaced fracture noted at the mid shaft of the right humerus.  No evidence for lytic or blastic lesion.    High-riding humeral head in keeping with rotator cuff tear.  Advanced acromioclavicular arthropathy noted.    Surgical clips noted in the right chest.                                       X-Ray Forearm Right (Final result)  Result time 04/22/25 11:26:06      Final result by Binh Camara MD (04/22/25 11:26:06)                   Impression:      As above.      Electronically signed by: Binh Camara MD  Date:    04/22/2025  Time:    11:26               Narrative:    EXAMINATION:  XR FOREARM RIGHT    CLINICAL HISTORY:  Unspecified injury of shoulder and upper arm, unspecified arm, initial encounter    TECHNIQUE:  Right forearm, two views    COMPARISON:  None    FINDINGS:  No fracture.  No lytic or blastic lesion.   Advanced arthritic changes are seen at the wrist and base of thumb with chondrocalcinosis.                                       X-Ray Hand 3 view Right (Final result)  Result time 04/22/25 11:26:54      Final result by Binh Camara MD (04/22/25 11:26:54)                   Impression:      As above.      Electronically signed by: Binh Camara MD  Date:    04/22/2025  Time:    11:26               Narrative:    EXAMINATION:  XR HAND COMPLETE 3 VIEW RIGHT    CLINICAL HISTORY:  fall;    TECHNIQUE:  PA, lateral, and oblique views of the right hand were performed.    COMPARISON:  01/26/2023    FINDINGS:  No fracture or dislocation.  There are advanced arthritic changes, most severe at the base of thumb, index DIP and radiocarpal joints.  Chondrocalcinosis noted about the TFCC.                                       X-Ray Chest 1 View (Final result)  Result time 04/22/25 11:24:45      Final result by Binh Camara MD (04/22/25 11:24:45)                   Impression:      As above.      Electronically signed by: Binh Camara MD  Date:    04/22/2025  Time:    11:24               Narrative:    EXAMINATION:  XR CHEST 1 VIEW    CLINICAL HISTORY:  Unspecified fall, initial encounter    TECHNIQUE:  Single frontal view of the chest was performed.    COMPARISON:  None    FINDINGS:  No consolidation, pleural effusion or pneumothorax.  There is perihilar interstitial prominence, perhaps pulmonary edema.  Cardiac silhouette is enlarged.  Surgical clips noted in the right chest.                                       Medications   morphine injection 2 mg (2 mg Intravenous Given 4/22/25 1356)   ondansetron injection 4 mg (4 mg Intravenous Given 4/22/25 1355)   sodium chloride 0.9% bolus 1,000 mL 1,000 mL (1,000 mLs Intravenous New Bag 4/22/25 1349)     Medical Decision Making  74-year-old female presents to the emergency department by EMS to be evaluated after she fell at home.  She said that she got up to go to the bathroom  "because she felt like she needed to have a bowel movement, then fell onto the floor and was not able to get up.  She said that she injured her right arm when she fell.  Denies head injury, headache, neck pain, back pain.  She said that she is not sure whether or not she lost consciousness.  Denies chest pain or shortness of breath.  She said that when she was able to get up to the bathroom, she passed a large amount of stool.  She said that she sat on the toilet for an extended amount of time because she "just kept going."   EKG ordered and reviewed by , rate is 85, sinus rhythm, no STEMI  X-ray ordered, films reviewed as well as the radiologist's interpretation significant for Displaced fracture noted at the mid shaft of the right humerus.  No evidence for lytic or blastic lesion.High-riding humeral head in keeping with rotator cuff tear.  Advanced acromioclavicular arthropathy noted.  Diagnosis: Fall, humerus fracture  Case discussed with Orthopedics, .  Will apply sling and follow-up with him in clinic tomorrow morning    Amount and/or Complexity of Data Reviewed  Labs: ordered.  Radiology: ordered. Decision-making details documented in ED Course.    Risk  Prescription drug management.               ED Course as of 04/22/25 1501   Tue Apr 22, 2025   1220 X-Ray Chest 1 View []      ED Course User Index  [JM] Tracy Singer FNP                           Clinical Impression:   Final diagnoses:  [W19.XXXA] Fall  [S49.90XA] Arm injury  [S42.391A] Other closed fracture of shaft of right humerus, initial encounter (Primary)        ED Disposition Condition    Discharge Good          ED Prescriptions       Medication Sig Dispense Start Date End Date Auth. Provider    HYDROcodone-acetaminophen (NORCO) 5-325 mg per tablet Take 1 tablet by mouth every 6 (six) hours as needed. 12 tablet 4/22/2025 -- Tracy Singer FNP          Follow-up Information    None              [1]   Social " History  Tobacco Use    Smoking status: Never     Passive exposure: Never    Smokeless tobacco: Never   Substance Use Topics    Alcohol use: Never    Drug use: Never        Tracy Singer, [facility]  04/22/25 150

## 2025-04-22 NOTE — DISCHARGE INSTRUCTIONS
Follow-up with Dr. Garza tomorrow in clinic.  Wear sling. Follow up with your primary care provider in 2 days. Return to the emergency department for any increase in symptoms or for any other new or worrisome symptoms.

## 2025-04-23 ENCOUNTER — OFFICE VISIT (OUTPATIENT)
Dept: ORTHOPEDICS | Facility: CLINIC | Age: 75
End: 2025-04-23
Payer: MEDICARE

## 2025-04-23 ENCOUNTER — TELEPHONE (OUTPATIENT)
Dept: EMERGENCY MEDICINE | Facility: HOSPITAL | Age: 75
End: 2025-04-23
Payer: MEDICARE

## 2025-04-23 ENCOUNTER — CLINICAL SUPPORT (OUTPATIENT)
Dept: CARDIOLOGY | Facility: CLINIC | Age: 75
End: 2025-04-23
Payer: MEDICARE

## 2025-04-23 ENCOUNTER — HOSPITAL ENCOUNTER (OUTPATIENT)
Dept: RADIOLOGY | Facility: HOSPITAL | Age: 75
Discharge: HOME | End: 2025-04-23
Attending: ORTHOPAEDIC SURGERY
Payer: MEDICARE

## 2025-04-23 ENCOUNTER — RESULTS FOLLOW-UP (OUTPATIENT)
Dept: FAMILY MEDICINE | Facility: CLINIC | Age: 75
End: 2025-04-23

## 2025-04-23 DIAGNOSIS — Z01.818 ENCOUNTER FOR OTHER PREPROCEDURAL EXAMINATION: ICD-10-CM

## 2025-04-23 DIAGNOSIS — S42.391A OTHER CLOSED FRACTURE OF SHAFT OF RIGHT HUMERUS, INITIAL ENCOUNTER: Primary | ICD-10-CM

## 2025-04-23 PROBLEM — S42.331A CLOSED DISPLACED OBLIQUE FRACTURE OF SHAFT OF RIGHT HUMERUS: Status: ACTIVE | Noted: 2025-04-23

## 2025-04-23 PROCEDURE — 71045 X-RAY EXAM CHEST 1 VIEW: CPT | Mod: 26,,, | Performed by: RADIOLOGY

## 2025-04-23 PROCEDURE — 99999 PR PBB SHADOW E&M-EST. PATIENT-LVL II: CPT | Mod: PBBFAC,,,

## 2025-04-23 PROCEDURE — 99215 OFFICE O/P EST HI 40 MIN: CPT | Mod: PBBFAC | Performed by: ORTHOPAEDIC SURGERY

## 2025-04-23 PROCEDURE — 93005 ELECTROCARDIOGRAM TRACING: CPT | Mod: PBBFAC | Performed by: HOSPITALIST

## 2025-04-23 PROCEDURE — 71045 X-RAY EXAM CHEST 1 VIEW: CPT | Mod: TC

## 2025-04-23 PROCEDURE — 93010 ELECTROCARDIOGRAM REPORT: CPT | Mod: S$PBB,,, | Performed by: HOSPITALIST

## 2025-04-23 PROCEDURE — 99212 OFFICE O/P EST SF 10 MIN: CPT | Mod: PBBFAC,25

## 2025-04-23 PROCEDURE — 99999 PR PBB SHADOW E&M-EST. PATIENT-LVL V: CPT | Mod: PBBFAC,,, | Performed by: ORTHOPAEDIC SURGERY

## 2025-04-23 NOTE — PROGRESS NOTES
CLINIC NOTE       Chief Complaint   Patient presents with    Right Upper Arm - Injury, Pain        Luna Wolf is a 74 y.o. female seen today for evaluation of right shoulder injury.  She has reportedly tripped on a wet floor while going to the bathroom yesterday.  She did experience some dizziness that she attributes to low blood sugar.  She is an insulin dependent diabetic.  She has not had any subsequent medical issues.  She injured her right arm was seen rush emergency room.  X-rays revealed a displaced short oblique fracture of the mid humerus.  She is placed in his shoulder arm sling immobilizer and referred for orthopedic consultation.  X-rays right shoulder 04/22/2025 shows a displaced short oblique fracture of the mid humeral diaphysis.  There is is well evidence of cuff arthropathy with the humeral head is high-riding and making contact and beneath the acromion process.  The patient confirms history of progressive right shoulder problems.  She has been aware of pain and crepitance.  She states she had 1 evaluation with a an x-ray in [facility] but he is not aware of any diagnosis.    Past Medical History:   Diagnosis Date    Breast cancer in female     Cancer     Diabetes mellitus     Hypertension     Mixed hyperlipidemia      Family History   Problem Relation Name Age of Onset    Hypertension Brother      Diabetes Brother       Medications Ordered Prior to Encounter[1]    ROS     There were no vitals filed for this visit.    Past Surgical History:   Procedure Laterality Date    BREAST BIOPSY      CERVICAL DISC SURGERY      TUBAL LIGATION          Review of patient's allergies indicates:   Allergen Reactions    Amlodipine Other (See Comments)    Candesartan      Other Reaction(s): nausea/vomiting,     Pravastatin Other (See Comments)    Ibuprofen Nausea And Vomiting     Caused upset stomache    Metformin Nausea Only        Ortho Exam right shoulder and arm shows moderate soft tissue  "swelling and early ecchymosis.  She resists shoulder motion secondary to pain.  The right elbow forearm and wrist and hand appear atraumatic    Radiographic Examination:    Technique:    Findings:    Impression:   See Above    Assessment and Plan  Patient Active Problem List    Diagnosis Date Noted    Stage 3b chronic kidney disease 03/01/2024    Type 2 diabetes mellitus, with long-term current use of insulin 08/28/2023    Essential hypertension 08/28/2023    Mixed hyperlipidemia 08/28/2023    Cervical spinal stenosis 08/28/2023    Hx of colonic polyps 12/28/2022    Diverticulosis of colon 12/28/2022    Impression: Closed displaced right humeral diaphyseal fracture superimposed on cuff arthropathy  Plan:  ORIF right humerus fracture with intramedullary interlocking jony recommended.  Discussed the cuff arthropathy in the potential to later perform a reverse total shoulder replacement arthroplasty once her humerus fracture is healed.  The patient is on Ozempic but this has an urgent situation and we should proceed with surgery tomorrow as above.  Anesthesia be notified.      Zach Campos M.D.                   [1]   Current Outpatient Medications on File Prior to Visit   Medication Sig Dispense Refill    ACCU-CHEK GAY PLUS TEST STRP Strp For home blood glucose monitoring three times daily. Dx: Type II DM E11.9 300 strip 3    aspirin (ECOTRIN) 81 MG EC tablet Take 81 mg by mouth once daily.      ATACAND HCT 32-25 mg Tab Take 1 tablet by mouth once daily. 90 each 1    BD ULTRA-FINE MINI PEN NEEDLE 31 gauge x 3/16" Ndle USE AS DIRECTED WITH VICTOZA 100 each 5    blood sugar diagnostic Strp For 3 times daily home blood glucose monitoring Dx: Type II DM with hyperglycemia Insulin Dependent E11.65 270 strip 3    blood sugar diagnostic Strp To check BG 1 times daily, to use with insurance preferred meter 100 strip 5    blood-glucose meter kit For 3 times daily home blood glucose monitoring Dx: Type II DM with " hyperglycemia Insulin Dependent E11.65 1 each 0    fexofenadine (ALLEGRA ALLERGY) 60 MG tablet Take 1 tablet (60 mg total) by mouth daily as needed (allergies). 90 tablet 1    fluticasone propionate (FLONASE) 50 mcg/actuation nasal spray 1 spray (50 mcg total) by Each Nostril route 2 (two) times daily as needed for Rhinitis. 16 g 0    glipiZIDE (GLUCOTROL) 10 MG tablet Take 1 tablet (10 mg total) by mouth 2 (two) times daily before meals. 180 tablet 1    HYDROcodone-acetaminophen (NORCO) 5-325 mg per tablet Take 1 tablet by mouth every 6 (six) hours as needed. 12 tablet 0    montelukast (SINGULAIR) 10 mg tablet Take 1 tablet (10 mg total) by mouth once daily. 90 tablet 1    rosuvastatin (CRESTOR) 10 MG tablet Take 1 tablet (10 mg total) by mouth every evening. 90 tablet 1    semaglutide (OZEMPIC) 0.25 mg or 0.5 mg (2 mg/3 mL) pen injector Inject 0.5 mg into the skin every 7 days. 9 mL 1    TRESIBA FLEXTOUCH U-200 200 unit/mL (3 mL) insulin pen Inject 56 Units into the skin once daily. 9 Pen 3     Current Facility-Administered Medications on File Prior to Visit   Medication Dose Route Frequency Provider Last Rate Last Admin    [COMPLETED] morphine injection 2 mg  2 mg Intravenous ED 1 Time Tracy Singer FNP   2 mg at 04/22/25 1356    [COMPLETED] ondansetron injection 4 mg  4 mg Intravenous ED 1 Time Tracy Singer FNP   4 mg at 04/22/25 1355    [COMPLETED] sodium chloride 0.9% bolus 1,000 mL 1,000 mL  1,000 mL Intravenous ED 1 Time Tracy Singer FNP   Stopped at 04/22/25 4052

## 2025-04-23 NOTE — PATIENT INSTRUCTIONS
You will need these completed prior to surgery.     ___x___ Lab (Clinic Lab 1st Floor)  ___x___ Chest X-ray ([facility] 1st Floor)  ___x___ EKG (Clinic 2nd Floor)

## 2025-04-24 ENCOUNTER — ANESTHESIA (OUTPATIENT)
Dept: SURGERY | Facility: HOSPITAL | Age: 75
End: 2025-04-24
Payer: MEDICARE

## 2025-04-24 ENCOUNTER — HOSPITAL ENCOUNTER (OUTPATIENT)
Facility: HOSPITAL | Age: 75
Discharge: HOME | End: 2025-04-24
Attending: ORTHOPAEDIC SURGERY | Admitting: ORTHOPAEDIC SURGERY
Payer: MEDICARE

## 2025-04-24 ENCOUNTER — ANESTHESIA EVENT (OUTPATIENT)
Dept: SURGERY | Facility: HOSPITAL | Age: 75
End: 2025-04-24
Payer: MEDICARE

## 2025-04-24 VITALS
BODY MASS INDEX: 26.66 KG/M2 | HEIGHT: 69 IN | SYSTOLIC BLOOD PRESSURE: 183 MMHG | DIASTOLIC BLOOD PRESSURE: 83 MMHG | TEMPERATURE: 97 F | OXYGEN SATURATION: 96 % | WEIGHT: 180 LBS | RESPIRATION RATE: 16 BRPM | HEART RATE: 76 BPM

## 2025-04-24 DIAGNOSIS — S42.331A: Primary | ICD-10-CM

## 2025-04-24 LAB
GLUCOSE SERPL-MCNC: 204 MG/DL (ref 70–105)
GLUCOSE SERPL-MCNC: 236 MG/DL (ref 70–105)

## 2025-04-24 PROCEDURE — 63600175 PHARM REV CODE 636 W HCPCS

## 2025-04-24 PROCEDURE — C1713 ANCHOR/SCREW BN/BN,TIS/BN: HCPCS | Performed by: ORTHOPAEDIC SURGERY

## 2025-04-24 PROCEDURE — 27000510 HC BLANKET BAIR HUGGER ANY SIZE: Performed by: ANESTHESIOLOGY

## 2025-04-24 PROCEDURE — 27202344 HC EYESHIELD: Performed by: ANESTHESIOLOGY

## 2025-04-24 PROCEDURE — 25000003 PHARM REV CODE 250

## 2025-04-24 PROCEDURE — 27000689 HC BLADE LARYNGOSCOPE ANY SIZE: Performed by: ANESTHESIOLOGY

## 2025-04-24 PROCEDURE — 36000710: Performed by: ORTHOPAEDIC SURGERY

## 2025-04-24 PROCEDURE — 36000711: Performed by: ORTHOPAEDIC SURGERY

## 2025-04-24 PROCEDURE — 71000015 HC POSTOP RECOV 1ST HR: Performed by: ORTHOPAEDIC SURGERY

## 2025-04-24 PROCEDURE — 37000009 HC ANESTHESIA EA ADD 15 MINS: Performed by: ORTHOPAEDIC SURGERY

## 2025-04-24 PROCEDURE — 71000016 HC POSTOP RECOV ADDL HR: Performed by: ORTHOPAEDIC SURGERY

## 2025-04-24 PROCEDURE — 24516 TX HUMRL SHAFT FX IMED IMPLT: CPT | Mod: RT,,, | Performed by: ORTHOPAEDIC SURGERY

## 2025-04-24 PROCEDURE — 27000165 HC TUBE, ETT CUFFED: Performed by: ANESTHESIOLOGY

## 2025-04-24 PROCEDURE — 27201423 OPTIME MED/SURG SUP & DEVICES STERILE SUPPLY: Performed by: ORTHOPAEDIC SURGERY

## 2025-04-24 PROCEDURE — 27000716 HC OXISENSOR PROBE, ANY SIZE: Performed by: ANESTHESIOLOGY

## 2025-04-24 PROCEDURE — 25000003 PHARM REV CODE 250: Performed by: ORTHOPAEDIC SURGERY

## 2025-04-24 PROCEDURE — 27201480 HC GLIDESCOPE: Performed by: ANESTHESIOLOGY

## 2025-04-24 PROCEDURE — 37000008 HC ANESTHESIA 1ST 15 MINUTES: Performed by: ORTHOPAEDIC SURGERY

## 2025-04-24 PROCEDURE — 71000033 HC RECOVERY, INTIAL HOUR: Performed by: ORTHOPAEDIC SURGERY

## 2025-04-24 PROCEDURE — C1769 GUIDE WIRE: HCPCS | Performed by: ORTHOPAEDIC SURGERY

## 2025-04-24 PROCEDURE — 27000655: Performed by: ANESTHESIOLOGY

## 2025-04-24 DEVICE — LOCKING SCREW, FULLY THREADED: Type: IMPLANTABLE DEVICE | Site: HUMERUS | Status: FUNCTIONAL

## 2025-04-24 RX ORDER — ACETAMINOPHEN 500 MG
1000 TABLET ORAL EVERY 6 HOURS PRN
Status: CANCELLED | OUTPATIENT
Start: 2025-04-24

## 2025-04-24 RX ORDER — ONDANSETRON HYDROCHLORIDE 2 MG/ML
INJECTION, SOLUTION INTRAVENOUS
Status: DISCONTINUED | OUTPATIENT
Start: 2025-04-24 | End: 2025-04-24

## 2025-04-24 RX ORDER — MORPHINE SULFATE 10 MG/ML
4 INJECTION INTRAMUSCULAR; INTRAVENOUS; SUBCUTANEOUS EVERY 5 MIN PRN
Status: DISCONTINUED | OUTPATIENT
Start: 2025-04-24 | End: 2025-04-24 | Stop reason: HOSPADM

## 2025-04-24 RX ORDER — ROCURONIUM BROMIDE 10 MG/ML
INJECTION, SOLUTION INTRAVENOUS
Status: DISCONTINUED | OUTPATIENT
Start: 2025-04-24 | End: 2025-04-24

## 2025-04-24 RX ORDER — ACETAMINOPHEN 10 MG/ML
INJECTION, SOLUTION INTRAVENOUS
Status: DISCONTINUED | OUTPATIENT
Start: 2025-04-24 | End: 2025-04-24

## 2025-04-24 RX ORDER — SODIUM CHLORIDE 9 MG/ML
INJECTION, SOLUTION INTRAVENOUS CONTINUOUS
Status: DISCONTINUED | OUTPATIENT
Start: 2025-04-24 | End: 2025-04-24 | Stop reason: HOSPADM

## 2025-04-24 RX ORDER — HYDROCODONE BITARTRATE AND ACETAMINOPHEN 5; 325 MG/1; MG/1
1 TABLET ORAL EVERY 4 HOURS PRN
Refills: 0 | Status: CANCELLED | OUTPATIENT
Start: 2025-04-24

## 2025-04-24 RX ORDER — GLUCAGON 1 MG
1 KIT INJECTION
Status: DISCONTINUED | OUTPATIENT
Start: 2025-04-24 | End: 2025-04-24 | Stop reason: HOSPADM

## 2025-04-24 RX ORDER — HYDROMORPHONE HYDROCHLORIDE 2 MG/ML
0.5 INJECTION, SOLUTION INTRAMUSCULAR; INTRAVENOUS; SUBCUTANEOUS EVERY 5 MIN PRN
Status: DISCONTINUED | OUTPATIENT
Start: 2025-04-24 | End: 2025-04-24 | Stop reason: HOSPADM

## 2025-04-24 RX ORDER — MEPERIDINE HYDROCHLORIDE 25 MG/ML
25 INJECTION INTRAMUSCULAR; INTRAVENOUS; SUBCUTANEOUS EVERY 10 MIN PRN
Status: DISCONTINUED | OUTPATIENT
Start: 2025-04-24 | End: 2025-04-24 | Stop reason: HOSPADM

## 2025-04-24 RX ORDER — PHENYLEPHRINE HYDROCHLORIDE 10 MG/ML
INJECTION INTRAVENOUS
Status: DISCONTINUED | OUTPATIENT
Start: 2025-04-24 | End: 2025-04-24

## 2025-04-24 RX ORDER — HYDROCODONE BITARTRATE AND ACETAMINOPHEN 10; 325 MG/1; MG/1
1 TABLET ORAL EVERY 4 HOURS PRN
Refills: 0 | Status: CANCELLED | OUTPATIENT
Start: 2025-04-24

## 2025-04-24 RX ORDER — ONDANSETRON 4 MG/1
8 TABLET, ORALLY DISINTEGRATING ORAL EVERY 8 HOURS PRN
Status: CANCELLED | OUTPATIENT
Start: 2025-04-24

## 2025-04-24 RX ORDER — FENTANYL CITRATE 50 UG/ML
INJECTION, SOLUTION INTRAMUSCULAR; INTRAVENOUS
Status: DISCONTINUED | OUTPATIENT
Start: 2025-04-24 | End: 2025-04-24

## 2025-04-24 RX ORDER — LIDOCAINE HYDROCHLORIDE 20 MG/ML
INJECTION, SOLUTION EPIDURAL; INFILTRATION; INTRACAUDAL; PERINEURAL
Status: DISCONTINUED | OUTPATIENT
Start: 2025-04-24 | End: 2025-04-24

## 2025-04-24 RX ORDER — DIPHENHYDRAMINE HYDROCHLORIDE 50 MG/ML
25 INJECTION, SOLUTION INTRAMUSCULAR; INTRAVENOUS EVERY 6 HOURS PRN
Status: DISCONTINUED | OUTPATIENT
Start: 2025-04-24 | End: 2025-04-24 | Stop reason: HOSPADM

## 2025-04-24 RX ORDER — PROPOFOL 10 MG/ML
VIAL (ML) INTRAVENOUS
Status: DISCONTINUED | OUTPATIENT
Start: 2025-04-24 | End: 2025-04-24

## 2025-04-24 RX ORDER — CEFAZOLIN 2 G/1
2 INJECTION, POWDER, FOR SOLUTION INTRAMUSCULAR; INTRAVENOUS
Status: DISCONTINUED | OUTPATIENT
Start: 2025-04-24 | End: 2025-04-24 | Stop reason: HOSPADM

## 2025-04-24 RX ORDER — HYDROMORPHONE HYDROCHLORIDE 2 MG/ML
INJECTION, SOLUTION INTRAMUSCULAR; INTRAVENOUS; SUBCUTANEOUS
Status: DISCONTINUED | OUTPATIENT
Start: 2025-04-24 | End: 2025-04-24

## 2025-04-24 RX ORDER — PROMETHAZINE HYDROCHLORIDE 25 MG/1
25 TABLET ORAL EVERY 6 HOURS PRN
Status: CANCELLED | OUTPATIENT
Start: 2025-04-24

## 2025-04-24 RX ORDER — SUCCINYLCHOLINE CHLORIDE 20 MG/ML
INJECTION INTRAMUSCULAR; INTRAVENOUS
Status: DISCONTINUED | OUTPATIENT
Start: 2025-04-24 | End: 2025-04-24

## 2025-04-24 RX ORDER — ONDANSETRON HYDROCHLORIDE 2 MG/ML
4 INJECTION, SOLUTION INTRAVENOUS DAILY PRN
Status: DISCONTINUED | OUTPATIENT
Start: 2025-04-24 | End: 2025-04-24 | Stop reason: HOSPADM

## 2025-04-24 RX ORDER — HYDROCODONE BITARTRATE AND ACETAMINOPHEN 5; 325 MG/1; MG/1
1 TABLET ORAL EVERY 6 HOURS PRN
Qty: 28 TABLET | Refills: 0 | Status: SHIPPED | OUTPATIENT
Start: 2025-04-24 | End: 2025-05-01

## 2025-04-24 RX ORDER — EPHEDRINE SULFATE 50 MG/ML
INJECTION, SOLUTION INTRAVENOUS
Status: DISCONTINUED | OUTPATIENT
Start: 2025-04-24 | End: 2025-04-24

## 2025-04-24 RX ADMIN — HYDROMORPHONE HYDROCHLORIDE 0.8 MG: 2 INJECTION, SOLUTION INTRAMUSCULAR; INTRAVENOUS; SUBCUTANEOUS at 12:04

## 2025-04-24 RX ADMIN — PHENYLEPHRINE HYDROCHLORIDE 200 MCG: 10 INJECTION INTRAVENOUS at 11:04

## 2025-04-24 RX ADMIN — ROCURONIUM BROMIDE 45 MG: 10 INJECTION, SOLUTION INTRAVENOUS at 11:04

## 2025-04-24 RX ADMIN — SUCCINYLCHOLINE CHLORIDE 100 MG: 20 INJECTION, SOLUTION INTRAMUSCULAR; INTRAVENOUS; PARENTERAL at 11:04

## 2025-04-24 RX ADMIN — PHENYLEPHRINE HYDROCHLORIDE 200 MCG: 10 INJECTION INTRAVENOUS at 12:04

## 2025-04-24 RX ADMIN — ROCURONIUM BROMIDE 5 MG: 10 INJECTION, SOLUTION INTRAVENOUS at 11:04

## 2025-04-24 RX ADMIN — ACETAMINOPHEN 1000 MG: 10 INJECTION INTRAVENOUS at 12:04

## 2025-04-24 RX ADMIN — PROPOFOL 120 MG: 10 INJECTION, EMULSION INTRAVENOUS at 11:04

## 2025-04-24 RX ADMIN — EPHEDRINE SULFATE 20 MG: 50 INJECTION INTRAVENOUS at 12:04

## 2025-04-24 RX ADMIN — SODIUM CHLORIDE: 9 INJECTION, SOLUTION INTRAVENOUS at 10:04

## 2025-04-24 RX ADMIN — LIDOCAINE HYDROCHLORIDE 100 MG: 20 INJECTION, SOLUTION INTRAVENOUS at 11:04

## 2025-04-24 RX ADMIN — ROCURONIUM BROMIDE 10 MG: 10 INJECTION, SOLUTION INTRAVENOUS at 12:04

## 2025-04-24 RX ADMIN — ONDANSETRON 4 MG: 2 INJECTION INTRAMUSCULAR; INTRAVENOUS at 11:04

## 2025-04-24 RX ADMIN — SUGAMMADEX 200 MG: 100 INJECTION, SOLUTION INTRAVENOUS at 01:04

## 2025-04-24 RX ADMIN — SODIUM CHLORIDE 2 G: 9 INJECTION, SOLUTION INTRAVENOUS at 11:04

## 2025-04-24 RX ADMIN — EPHEDRINE SULFATE 30 MG: 50 INJECTION INTRAVENOUS at 12:04

## 2025-04-24 RX ADMIN — FENTANYL CITRATE 100 MCG: 50 INJECTION, SOLUTION INTRAMUSCULAR; INTRAVENOUS at 11:04

## 2025-04-24 NOTE — H&P
[facility]  Orthopedics  H&P    Patient Name: Luna Wolf  MRN: [id number]  Admission Date: (Not on file)  Primary Care Provider: Sushila Mcdaniels MD    Patient information was obtained from patient and ER records.     Subjective:     Principal Problem:Closed displaced oblique fracture of shaft of right humerus    Chief Complaint: No chief complaint on file.       HPI: Chief complaint:  Right humerus fracture   History:   Luna Wolf is a 74 y.o. female seen  for evaluation of right shoulder injury.  She  reportedly tripped on a wet floor while going to the bathroom yesterday.  She did experience some dizziness that she attributes to low blood sugar.  She is an insulin dependent diabetic.  She has not had any subsequent medical issues.  She injured her right arm was [facility].  X-rays revealed a displaced short oblique fracture of the mid humerus.  She is placed in his shoulder arm sling immobilizer and referred for orthopedic consultation.  X-rays right shoulder 04/22/2025 shows a displaced short oblique fracture of the mid humeral diaphysis.  There is as well evidence of cuff arthropathy with the humeral head is high-riding and making contact and beneath the acromion process.  The patient confirms history of progressive right shoulder problems.  She has been aware of pain and crepitance.  She states she had 1 evaluation with a an x-ray in [facility] but he is not aware of any diagnosis.  Impression:  Closed displaced right humeral diaphyseal fracture superimposed on chronic cuff arthropathy in his outlined plan:  IM nailing right humerus fracture/cuff arthropathy  Plan:  I am kneeling right humerus fracture.  Just possible reverse shoulder replacement arthroplasty future after humerus shaft fracture healing.  Benefits and risks of surgery outlined to include but not limited to bleeding, infection, damage to blood vessels nerves, need further  "surgery, other risks and complications including even death the patient wished preserve.  She is on Ozempic last used 3 days ago.  Recommended we proceed he is an urgent procedure.          Past Medical History:   Diagnosis Date    Breast cancer in female     Cancer     Diabetes mellitus     Hypertension     Mixed hyperlipidemia        Past Surgical History:   Procedure Laterality Date    BREAST BIOPSY      CERVICAL DISC SURGERY      TUBAL LIGATION         Review of patient's allergies indicates:   Allergen Reactions    Amlodipine Other (See Comments)    Candesartan      Other Reaction(s): nausea/vomiting,     Pravastatin Other (See Comments)    Ibuprofen Nausea And Vomiting     Caused upset stomache    Metformin Nausea Only       No current facility-administered medications for this encounter.     Current Outpatient Medications   Medication Sig    ACCU-CHEK GAY PLUS TEST STRP Strp For home blood glucose monitoring three times daily. Dx: Type II DM E11.9    aspirin (ECOTRIN) 81 MG EC tablet Take 81 mg by mouth once daily.    ATACAND HCT 32-25 mg Tab Take 1 tablet by mouth once daily.    BD ULTRA-FINE MINI PEN NEEDLE 31 gauge x 3/16" Ndle USE AS DIRECTED WITH VICTOZA    blood sugar diagnostic Strp For 3 times daily home blood glucose monitoring Dx: Type II DM with hyperglycemia Insulin Dependent E11.65    blood sugar diagnostic Strp To check BG 1 times daily, to use with insurance preferred meter    blood-glucose meter kit For 3 times daily home blood glucose monitoring Dx: Type II DM with hyperglycemia Insulin Dependent E11.65    fexofenadine (ALLEGRA ALLERGY) 60 MG tablet Take 1 tablet (60 mg total) by mouth daily as needed (allergies).    fluticasone propionate (FLONASE) 50 mcg/actuation nasal spray 1 spray (50 mcg total) by Each Nostril route 2 (two) times daily as needed for Rhinitis.    glipiZIDE (GLUCOTROL) 10 MG tablet Take 1 tablet (10 mg total) by mouth 2 (two) times daily before meals.    " HYDROcodone-acetaminophen (NORCO) 5-325 mg per tablet Take 1 tablet by mouth every 6 (six) hours as needed.    montelukast (SINGULAIR) 10 mg tablet Take 1 tablet (10 mg total) by mouth once daily.    rosuvastatin (CRESTOR) 10 MG tablet Take 1 tablet (10 mg total) by mouth every evening.    semaglutide (OZEMPIC) 0.25 mg or 0.5 mg (2 mg/3 mL) pen injector Inject 0.5 mg into the skin every 7 days.    TRESIBA FLEXTOUCH U-200 200 unit/mL (3 mL) insulin pen Inject 56 Units into the skin once daily.     Family History       Problem Relation (Age of Onset)    Diabetes Brother    Hypertension Brother          Tobacco Use    Smoking status: Never     Passive exposure: Never    Smokeless tobacco: Never   Substance and Sexual Activity    Alcohol use: Never    Drug use: Never    Sexual activity: Not Currently     Review of Systems   Constitutional: Negative.     Objective:     Vital Signs (Most Recent):    Vital Signs (24h Range):              There is no height or weight on file to calculate BMI.    No intake or output data in the 24 hours ending 04/23/25 2045     General    Vitals reviewed.  Constitutional: She is oriented to person, place, and time. She appears well-developed and well-nourished.   HENT:   Head: Atraumatic.   Nose: Nose normal.   Eyes: EOM are normal. Pupils are equal, round, and reactive to light.   Neck: Neck supple.   Cardiovascular:  Normal rate, regular rhythm and normal heart sounds.            Pulmonary/Chest: Effort normal and breath sounds normal.   Abdominal: Soft. Bowel sounds are normal.   Neurological: She is alert and oriented to person, place, and time.   Psychiatric: She has a normal mood and affect. Her behavior is normal.         Right Shoulder Exam     Inspection/Observation   Bruising: present  Deformity: present    Range of Motion   Active abduction:  abnormal   Passive abduction:  abnormal   Extension:  abnormal   Forward Flexion:  abnormal   Forward Elevation: abnormal  Adduction:  abnormal    Tests & Signs   Rotator Cuff Painful Arc/Range: moderate    Other   Sensation: normal    Left Shoulder Exam   Left shoulder exam is normal.       Muscle Strength   Right Upper Extremity   Supraspinatus: 0/5     Vascular Exam     Right Pulses      Radial:                    2+      Capillary Refill  Right Hand: normal capillary refill           Significant Labs: All pertinent labs within the past 24 hours have been reviewed.    Significant Imaging: I have reviewed all pertinent imaging results/findings.  Assessment/Plan:     No notes have been filed under this hospital service.  Service: Orthopedic Surgery      Zach Campos MD  Orthopedics  [facility]

## 2025-04-24 NOTE — ANESTHESIA PREPROCEDURE EVALUATION
04/24/2025  Luna Wolf is a 74 y.o., female.      Pre-op Assessment    I have reviewed the Patient Summary Reports.    I have reviewed the NPO Status.   I have reviewed the Medications.     Review of Systems         Anesthesia Plan  Type of Anesthesia, risks & benefits discussed:    Anesthesia Type: Gen ETT  Intra-op Monitoring Plan: Standard ASA Monitors  Post Op Pain Control Plan: IV/PO Opioids PRN  Induction:  IV  Informed Consent: Informed consent signed with the Patient and all parties understand the risks and agree with anesthesia plan.  All questions answered.   ASA Score: 3    Ready For Surgery From Anesthesia Perspective.     .  NPO greater than 8 hours  No anesthetic complications  Allergies      Amlodipine Drug Ingredient Other (See Comments) Not Specified  3/11/2025      Candesartan Drug Ingredient  Not Specified  3/11/2025   Other Reaction(s): nausea/vomiting,    Pravastatin Drug Ingredient Other (See Comments) Not Specified  3/11/2025      Ibuprofen Drug Ingredient Nausea And Vomiting Low Allergy 8/28/2023   Caused upset stomache   Metformin          Hct 34  Cr 1.8  4/22/25 EKG: Normal sinus rhythm; 85 bpm     IDDM  HTN  CKD  Advanced age    MP III; short neck with decreased extension

## 2025-04-24 NOTE — ANESTHESIA POSTPROCEDURE EVALUATION
Anesthesia Post Evaluation    Patient: Luna Wolf    Procedure(s) Performed: Procedure(s) (LRB):  INSERTION, INTRAMEDULLARY JOHNNA, HUMERUS (Right)    Final Anesthesia Type: general      Patient location during evaluation: PACU  Patient participation: Yes- Able to Participate  Level of consciousness: awake and alert and oriented  Post-procedure vital signs: reviewed and stable  Pain management: adequate  Airway patency: patent  TIGIST mitigation strategies: Multimodal analgesia  PONV status at discharge: No PONV  Anesthetic complications: no      Cardiovascular status: hemodynamically stable  Respiratory status: unassisted and spontaneous ventilation  Hydration status: euvolemic  Follow-up not needed.              Vitals Value Taken Time   /73 04/24/25 13:35   Temp 36.1 °C (97 °F) 04/24/25 13:24   Pulse 73 04/24/25 13:39   Resp 9 04/24/25 13:39   SpO2 100 % 04/24/25 13:39   Vitals shown include unfiled device data.      No case tracking events are documented in the log.      Pain/Lilo Score: Lilo Score: 7 (4/24/2025  1:22 PM)

## 2025-04-24 NOTE — TRANSFER OF CARE
"Anesthesia Transfer of Care Note    Patient: Luna Wolf    Procedure(s) Performed: Procedure(s) (LRB):  INSERTION, INTRAMEDULLARY JOHNNA, HUMERUS (Right)    Patient location: PACU    Anesthesia Type: general    Transport from OR: Transported from OR on 6-10 L/min O2 by face mask with adequate spontaneous ventilation    Post pain: adequate analgesia    Post assessment: tolerated procedure well and no apparent anesthetic complications    Post vital signs: stable    Level of consciousness: alert, awake and oriented    Nausea/Vomiting: no nausea/vomiting    Complications: none    Transfer of care protocol was followed      Last vitals: Visit Vitals  BP (!) 126/58   Pulse 75   Temp 36.1 °C (97 °F)   Resp 14   Ht 5' 9" (1.753 m)   Wt 81.6 kg (180 lb)   SpO2 100%   Breastfeeding No   BMI 26.58 kg/m²     "

## 2025-04-24 NOTE — INTERVAL H&P NOTE
The patient has been examined and the H&P has been reviewed:    I concur with the findings and no changes have occurred since H&P was written.    Surgery risks, benefits and alternative options discussed and understood by patient/family.          Active Hospital Problems    Diagnosis  POA    *Closed displaced oblique fracture of shaft of right humerus [S42.117A]  Yes      Resolved Hospital Problems   No resolved problems to display.

## 2025-04-24 NOTE — H&P
[facility]  Orthopedics  H&P    Patient Name: Luna Wolf  MRN: [id number]  Admission Date: (Not on file)  Primary Care Provider: Sushila Mcdaniels MD    Patient information was obtained from patient and ER records.     Subjective:     Principal Problem:Closed displaced oblique fracture of shaft of right humerus    Chief Complaint: No chief complaint on file.       HPI: Chief complaint:  Right humerus fracture   History:   Luna Wolf is a 74 y.o. female seen  for evaluation of right shoulder injury.  She  reportedly tripped on a wet floor while going to the bathroom yesterday.  She did experience some dizziness that she attributes to low blood sugar.  She is an insulin dependent diabetic.  She has not had any subsequent medical issues.  She injured her right arm was [facility].  X-rays revealed a displaced short oblique fracture of the mid humerus.  She is placed in his shoulder arm sling immobilizer and referred for orthopedic consultation.  X-rays right shoulder 04/22/2025 shows a displaced short oblique fracture of the mid humeral diaphysis.  There is as well evidence of cuff arthropathy with the humeral head is high-riding and making contact and beneath the acromion process.  The patient confirms history of progressive right shoulder problems.  She has been aware of pain and crepitance.  She states she had 1 evaluation with a an x-ray in [facility] but he is not aware of any diagnosis.  Impression:  Closed displaced right humeral diaphyseal fracture superimposed on chronic cuff arthropathy in his outlined plan:  IM nailing right humerus fracture/cuff arthropathy  Plan:  I am kneeling right humerus fracture.  Just possible reverse shoulder replacement arthroplasty future after humerus shaft fracture healing.  Benefits and risks of surgery outlined to include but not limited to bleeding, infection, damage to blood vessels nerves, need further  surgery, other risks and complications including even death the patient wished preserve.  She is on Ozempic last used 3 days ago.  Recommended we proceed he is an urgent procedure.          No new subjective & objective note has been filed under this hospital service since the last note was generated.    Assessment/Plan:     No notes have been filed under this hospital service.  Service: Orthopedic Surgery      Zach Campos MD  Orthopedics  [facility]

## 2025-04-24 NOTE — OP NOTE
[facility]  Surgery Department  Operative Note    SUMMARY     Date of Procedure: 4/24/2025     Procedure: Procedure(s) (LRB):  INSERTION, INTRAMEDULLARY JOHNNA, HUMERUS (Right)     Surgeons and Role:     * Zach Campos MD - Primary    Assisting Surgeon: None    Pre-Operative Diagnosis: Other closed fracture of shaft of right humerus, initial encounter [S42.391A]    Post-Operative Diagnosis: Post-Op Diagnosis Codes:     * Other closed fracture of shaft of right humerus, initial encounter [S42.391A]    Anesthesia: general    Technical Procedures Used:  The patient is taken the operating room and placed supine position.  After adequate level of general anesthesia been achieved (see anesthesia note) the patient's right shoulder and upper extremity were prepped with Betadine and draped in sterile fashion.  Examination of the right arm with the C-arm showed a displaced transverse fracture of the mid humeral diaphysis.  The humeral head was high-riding consistent with a diagnosis of chronic rotator cuff arthropathy.  The operation begun by making a linear skin incision over the anterolateral aspect of the shoulder.  Incision was carried carefully through subcutaneous layers.  The greater tuberosity was identified, visualized with the C-arm and entered with curved awl Reamer.  A guide wire was inserted and extended down the intramedullary canal of the humerus and across the fracture site to the distal humerus supracondylar region.  The intraosseous distance was measured.  An osseous tunnel was created by reaming over the guide pin to a final diameter of 9 mm.  An 8 mm diameter by 270 mm long Verónica titanium humeral nail was inserted over the guide pin and advanced distally across fracture site to the distal humeral supracondylar region.  Good position and alignment of the fracture and nail were confirmed with the C-arm in the AP and lateral planes.  Using the fixed angle guide to  transversely oriented locking screws were inserted through stab wounds in the skin proximally.  The distal locking screw in the nail was visualized with the C-arm.  Small incision was made over the anterior aspect of the distal humerus.  Soft tissue was spread with the hemostat.  Using the AO radiolucent drill a drill hole was made from anterior to posterior across the locking screw site in the jony.  Intraosseous distance was measured.  A 4 mm diameter locking screw was inserted from anterior to posterior.  Good position alignment of the fracture and hardware was confirmed with the C-arm in the AP and lateral planes.  An end cap was added to the proximal humeral nail.  All wounds were irrigated antibiotic solution.  The subcutaneous tissue was approximated with interrupted 2-0 Vicryl suture.  Skin margins approximated with stainless steel staples.    Description of the Findings of the Procedure:  Closed displaced right humeral diaphyseal fracture    Significant Surgical Tasks Conducted by the Assistant(s), if Applicable:  None     Complications: No    Estimated Blood Loss (EBL): 50 mL           Implants:   Implant Name Type Inv. Item Serial No.  Lot No. LRB No. Used Action   GUIDE WIRE SMOOTH TIP [id number] - [id number]  GUIDE WIRE SMOOTH TIP [id number]  Scaffold SOTERO.  Right 1 Implanted and Explanted   GUIDEWIRE BALL TIP 2.7H366XX - [id number]  GUIDEWIRE BALL TIP 2.6Z472RZ  CHETAN [employer] SOTERO. [id number] Right 1 Implanted and Explanted   WIRE [place] T2 [id number] - [id number]  WIRE [place] T2 [id number] SS  CHETAN[employer] SOTERO. [id number] Right 1 Implanted and Explanted   chetan humeral nail 8x270     [id number] Right 1 Implanted   chetan locking screw 45mm     [id number] Right 1 Implanted   chetan locking screw 40 mm     [id number] Right 1 Implanted   chetan end cap 5mm      [id number] Right 1 Implanted   SCREW SHAFT FT LORETTA 4X30MM - [id number]  SCREW SHAFT FT LORETTA 4X30MM  RAJESH [employer] SOTERO. [id number] Right 1 Implanted   TUBE T2 HUMERUS KATIANA - [id number]  TUBE T2 HUMERUS KATIANA  RAJESH [employer] SOTERO.  Right 1 Implanted and Explanted       Specimens:   Specimen (24h ago, onward)      None                    Condition: Good    Disposition: PACU - hemodynamically stable.    Attestation: I was present and scrubbed for the entire procedure.

## 2025-04-24 NOTE — HPI
Chief complaint:  Right humerus fracture   History:   Luna Wolf is a 74 y.o. female seen  for evaluation of right shoulder injury.  She  reportedly tripped on a wet floor while going to the bathroom yesterday.  She did experience some dizziness that she attributes to low blood sugar.  She is an insulin dependent diabetic.  She has not had any subsequent medical issues.  She [facility] her [facility] arm [facility].  X-rays revealed a displaced short oblique fracture of the mid humerus.  She is placed in his shoulder arm sling immobilizer and referred for orthopedic consultation.  X-rays right shoulder 04/22/2025 shows a displaced short oblique fracture of the mid humeral diaphysis.  There is as well evidence of cuff arthropathy with the humeral head is high-riding and making contact and beneath the acromion process.  The patient confirms history of progressive right shoulder problems.  She has been aware of pain and crepitance.  She states she had 1 evaluation with a an x-ray in [facility] but he is not aware of any diagnosis.  Impression:  Closed displaced right humeral diaphyseal fracture superimposed on chronic cuff arthropathy in his outlined plan:  IM nailing right humerus fracture/cuff arthropathy  Plan:  I am kneeling right humerus fracture.  Just possible reverse shoulder replacement arthroplasty future after humerus shaft fracture healing.  Benefits and risks of surgery outlined to include but not limited to bleeding, infection, damage to blood vessels nerves, need further surgery, other risks and complications including even death the patient wished preserve.  She is on Ozempic last used 3 days ago.  Recommended we proceed he is an urgent procedure.

## 2025-04-24 NOTE — PLAN OF CARE
1405: patient transferred to outpatient. O2 97% on RA, HR 80, /86. Neuro intact. R shoulder dressing C/D/I. R upper extremity pulses positive and cap refill less than 3. Patient denies pain. Family at the bedside. Report given to JHONNY Wilkes. Care released at this time.

## 2025-04-24 NOTE — BRIEF OP NOTE
[facility]  Brief Operative Note    Surgery Date: 4/24/2025     Surgeons and Role:     * Zach Campos MD - Primary    Assisting Surgeon: None    Pre-op Diagnosis:  Other closed fracture of shaft of right humerus, initial encounter [S42.391A]    Post-op Diagnosis:  Post-Op Diagnosis Codes:     * Other closed fracture of shaft of right humerus, initial encounter [S42.391A]    Procedure(s) (LRB):  INSERTION, INTRAMEDULLARY JOHNNA, HUMERUS (Right)    Anesthesia: general    Description of the findings of the procedure(s): See Op Note     Estimated Blood Loss: 50 mL         Specimens:   Specimen (24h ago, onward)      None              Discharge Note    OUTCOME: Patient tolerated treatment/procedure well without complication and is now ready for discharge.    DISPOSITION: Home or Self Care    FINAL DIAGNOSIS:  Closed displaced oblique fracture of shaft of right humerus    FOLLOWUP: In clinic    DISCHARGE INSTRUCTIONS:    Discharge Procedure Orders   Diet general     Keep surgical extremity elevated     Ice to affected area   Order Comments: using barrier between ice and skin (specify duration&frequency)     Remove dressing in 72 hours   Order Comments: Keep dressing in place for 72 hours     Change dressing (specify)   Order Comments: Dressing change: one time per day beginning 72 hours post op.     Call MD for:  temperature >100.4     Call MD for:  persistent nausea and vomiting     Call MD for:  severe uncontrolled pain     Call MD for:  difficulty breathing, headache or visual disturbances     Call MD for:  redness, tenderness, or signs of infection (pain, swelling, redness, odor or green/yellow discharge around incision site)     Call MD for:  hives     Call MD for:  persistent dizziness or light-headedness     Call MD for:  extreme fatigue     Activity as tolerated     Shower on day dressing removed (No bath)     Weight bearing as tolerated

## 2025-04-24 NOTE — SUBJECTIVE & OBJECTIVE
"Past Medical History:   Diagnosis Date    Breast cancer in female     Cancer     Diabetes mellitus     Hypertension     Mixed hyperlipidemia        Past Surgical History:   Procedure Laterality Date    BREAST BIOPSY      CERVICAL DISC SURGERY      TUBAL LIGATION         Review of patient's allergies indicates:   Allergen Reactions    Amlodipine Other (See Comments)    Candesartan      Other Reaction(s): nausea/vomiting,     Pravastatin Other (See Comments)    Ibuprofen Nausea And Vomiting     Caused upset stomache    Metformin Nausea Only       No current facility-administered medications for this encounter.     Current Outpatient Medications   Medication Sig    ACCU-CHEK GAY PLUS TEST STRP Strp For home blood glucose monitoring three times daily. Dx: Type II DM E11.9    aspirin (ECOTRIN) 81 MG EC tablet Take 81 mg by mouth once daily.    ATACAND HCT 32-25 mg Tab Take 1 tablet by mouth once daily.    BD ULTRA-FINE MINI PEN NEEDLE 31 gauge x 3/16" Ndle USE AS DIRECTED WITH VICTOZA    blood sugar diagnostic Strp For 3 times daily home blood glucose monitoring Dx: Type II DM with hyperglycemia Insulin Dependent E11.65    blood sugar diagnostic Strp To check BG 1 times daily, to use with insurance preferred meter    blood-glucose meter kit For 3 times daily home blood glucose monitoring Dx: Type II DM with hyperglycemia Insulin Dependent E11.65    fexofenadine (ALLEGRA ALLERGY) 60 MG tablet Take 1 tablet (60 mg total) by mouth daily as needed (allergies).    fluticasone propionate (FLONASE) 50 mcg/actuation nasal spray 1 spray (50 mcg total) by Each Nostril route 2 (two) times daily as needed for Rhinitis.    glipiZIDE (GLUCOTROL) 10 MG tablet Take 1 tablet (10 mg total) by mouth 2 (two) times daily before meals.    HYDROcodone-acetaminophen (NORCO) 5-325 mg per tablet Take 1 tablet by mouth every 6 (six) hours as needed.    montelukast (SINGULAIR) 10 mg tablet Take 1 tablet (10 mg total) by mouth once daily.    " rosuvastatin (CRESTOR) 10 MG tablet Take 1 tablet (10 mg total) by mouth every evening.    semaglutide (OZEMPIC) 0.25 mg or 0.5 mg (2 mg/3 mL) pen injector Inject 0.5 mg into the skin every 7 days.    TRESIBA FLEXTOUCH U-200 200 unit/mL (3 mL) insulin pen Inject 56 Units into the skin once daily.     Family History       Problem Relation (Age of Onset)    Diabetes Brother    Hypertension Brother          Tobacco Use    Smoking status: Never     Passive exposure: Never    Smokeless tobacco: Never   Substance and Sexual Activity    Alcohol use: Never    Drug use: Never    Sexual activity: Not Currently     Review of Systems   Constitutional: Negative.     Objective:     Vital Signs (Most Recent):    Vital Signs (24h Range):              There is no height or weight on file to calculate BMI.    No intake or output data in the 24 hours ending 04/23/25 2045     General    Vitals reviewed.  Constitutional: She is oriented to person, place, and time. She appears well-developed and well-nourished.   HENT:   Head: Atraumatic.   Nose: Nose normal.   Eyes: EOM are normal. Pupils are equal, round, and reactive to light.   Neck: Neck supple.   Cardiovascular:  Normal rate, regular rhythm and normal heart sounds.            Pulmonary/Chest: Effort normal and breath sounds normal.   Abdominal: Soft. Bowel sounds are normal.   Neurological: She is alert and oriented to person, place, and time.   Psychiatric: She has a normal mood and affect. Her behavior is normal.         Right Shoulder Exam     Inspection/Observation   Bruising: present  Deformity: present    Range of Motion   Active abduction:  abnormal   Passive abduction:  abnormal   Extension:  abnormal   Forward Flexion:  abnormal   Forward Elevation: abnormal  Adduction: abnormal    Tests & Signs   Rotator Cuff Painful Arc/Range: moderate    Other   Sensation: normal    Left Shoulder Exam   Left shoulder exam is normal.       Muscle Strength   Right Upper Extremity    Supraspinatus: 0/5     Vascular Exam     Right Pulses      Radial:                    2+      Capillary Refill  Right Hand: normal capillary refill           Significant Labs: All pertinent labs within the past 24 hours have been reviewed.    Significant Imaging: I have reviewed all pertinent imaging results/findings.

## 2025-04-24 NOTE — ANESTHESIA PROCEDURE NOTES
Intubation    Date/Time: 4/24/2025 11:44 AM    Performed by: Osorio Monterroso CRNA  Authorized by: Chapincito Khanna DO    Intubation:     Induction:  Rapid sequence induction    Intubated:  Postinduction    Mask Ventilation:  Not attempted    Attempts:  1    Attempted By:  CRNA    Method of Intubation:  Direct    Blade:  Glidescope 3    Laryngeal View Grade: Grade IIA - cords partially seen      Difficult Airway Encountered?: No      Complications:  None    Airway Device:  Oral endotracheal tube    Airway Device Size:  7.0    Style/Cuff Inflation:  Cuffed (inflated to minimal occlusive pressure)    Tube secured:  20    Secured at:  The lips    Placement Verified By:  Capnometry    Complicating Factors:  None    Findings Post-Intubation:  BS equal bilateral and atraumatic/condition of teeth unchanged

## 2025-04-24 NOTE — DISCHARGE SUMMARY
[facility] - Orthopedic Periop Services  Discharge Note  Short Stay    Procedure(s) (LRB):  INSERTION, INTRAMEDULLARY JOHNNA, HUMERUS (Right)      OUTCOME: Patient tolerated treatment/procedure well without complication and is now ready for discharge.    DISPOSITION: Home or Self Care    FINAL DIAGNOSIS:  Closed displaced oblique fracture of shaft of right humerus    FOLLOWUP: In clinic    DISCHARGE INSTRUCTIONS:    Discharge Procedure Orders   Diet general     Keep surgical extremity elevated     Ice to affected area   Order Comments: using barrier between ice and skin (specify duration&frequency)     Remove dressing in 72 hours   Order Comments: Keep dressing in place for 72 hours     Change dressing (specify)   Order Comments: Dressing change: one time per day beginning 72 hours post op.     Call MD for:  temperature >100.4     Call MD for:  persistent nausea and vomiting     Call MD for:  severe uncontrolled pain     Call MD for:  difficulty breathing, headache or visual disturbances     Call MD for:  redness, tenderness, or signs of infection (pain, swelling, redness, odor or green/yellow discharge around incision site)     Call MD for:  hives     Call MD for:  persistent dizziness or light-headedness     Call MD for:  extreme fatigue     Activity as tolerated     Shower on day dressing removed (No bath)     Weight bearing as tolerated        TIME SPENT ON DISCHARGE: 15 minutes

## 2025-04-25 ENCOUNTER — PATIENT OUTREACH (OUTPATIENT)
Facility: HOSPITAL | Age: 75
End: 2025-04-25
Payer: MEDICARE

## 2025-04-25 NOTE — LETTER
AUTHORIZATION FOR RELEASE OF   CONFIDENTIAL INFORMATION    Dear [facility],    We are seeing Luna Wolf, date of birth 1950, in the clinic at [facility]. Sushila Mcdaniels MD is the patient's PCP. Luna Wolf has an outstanding lab/procedure at the time we reviewed her chart. In order to help keep her health information updated, she has authorized us to request the following medical record(s):        (  )  MAMMOGRAM                                      (  )  COLONOSCOPY      (  )  PAP SMEAR                                          (  )  OUTSIDE LAB RESULTS     (  )  DEXA SCAN                                          ( x )  EYE EXAM            (  )  FOOT EXAM                                          (  )  ENTIRE RECORD     (  )  OUTSIDE IMMUNIZATIONS                 ( x )  2nd Request          Please fax records to [facility], Mallary Harvey, LPN Care Coordinator, [phone].      If you have any questions, please contact Lizz at [phone]. .           Patient Name: Luna Wolf  : 1950  Patient Phone #: [phone]                Luna Wolf  MRN: [id number]  : 1950  Age: 74 y.o.  Sex: female         Patient/Legal Guardian Signature  This signature was collected at 2025    SELF       _______________________________   Printed Name/Relationship to Patient      Consent for Examination and Treatment: I hereby authorize the providers and employees of [facility] ([facility]) to provide medical treatment/services which includes, but is not limited to, performing and administering tests and diagnostic procedures that are deemed necessary, including, but not limited to, imaging examinations, blood tests and other laboratory procedures as may be required by the hospital, clinic, or may be ordered by my physician(s) or persons working under the general and/or special instructions of my physician(s).      I understand and agree that this consent  covers all authorized persons, including but not limited to physicians, residents, nurse practitioners, physicians' assistants, specialists, consultants, student nurses, and independently contracted physicians, who are called upon by the physician in charge, to carry out the diagnostic procedures and medical or surgical treatment.     I hereby authorize [facility] to retain or dispose of any specimens or tissue, should there be such remaining from any test or procedure.     I hereby authorize and give consent for [facility] providers and employees to take photographs, images or videotapes of such diagnostic, surgical or treatment procedures of Patient as may be required by [facility] or as may be ordered by a physician. I further acknowledge and agree that [facility] may use cameras or other devices for patient monitoring.     I am aware that the practice of medicine is not an exact science, and I acknowledge that no guarantees have been made to me as to the outcome of any tests, procedures or treatment.     Authorization for Release of Information: I understand that my insurance company and/or their agents may need information necessary to make determinations about payment/reimbursement. I hereby provide authorization to release to all insurance companies, their successors, assignees, other parties with whom they may have contracted, or others acting on their behalf, that are involved with payment for any hospital and/or clinic charges incurred by the patient, any information that they request and deem necessary for payment/reimbursement, and/or quality review.  I further authorize the release of my health information to physicians or other health care practitioners on staff who are involved in my health care now and in the future, and to other health care providers, entities, or institutions for the purpose of my continued care and treatment, including referrals.     REGISTRATION AUTHORIZATION  Form No. 59421 (Rev. 3/25/2024)     Page 1 of 3                       Medicare Patient's Certification and Authorization to Release Information and Payment Request:  I certify that the information given by me in applying for payment under Title XVIII of the Social Security Act is correct. I authorize any mariano of medical or other information about me to release to the Social SecurityAdministration, or its intermediaries or carriers, any information needed for this or a related Medicare claim. I request that payment of authorized benefits be made on my behalf.     Assignment of Insurance Benefits:   I hereby authorize any and all insurance companies, health plans, defined   benefit plans, health insurers or any entity that is or may be responsible for payment of my medical expenses to pay all hospital and medical benefits now due, and to become due and payable to me under any hospital benefits, sick benefits, injury benefits or any other benefit for services rendered to me, including Major Medical Benefits, direct to [facility] and all independently contracted physicians. I assign any and all rights that I may have against any and all insurance companies, health plans, defined benefit plans, health insurers or any entity that is or may be responsible for payment of my medical expenses, including, but not limited to any right to appeal a denial of a claim, any right to bring any action, lawsuit, administrative proceeding, or other cause of action on my behalf. I specifically assign my right to pursue litigation against any and all insurance companies, health plans, defined benefit plans, health insurers or any entity that is or may be responsible for payment of my medical expenses based upon a refusal to pay charges.            E. Valuables: It is understood and agreed that [facility] is not liable for the damage to or loss of any money, jewelry,   documents, dentures, eye glasses, hearing aids, prosthetics, or other property of value.     F. Computer  Equipment: I understand and agree that should I choose to use computer equipment owned by [facility] or if I choose to access the Internet via [facility]s network, I do so at my own risk. [facility] is not responsible for any damage to my computer equipment or to any damages of any type that might arise from my loss of equipment or data.     G. Acceptance of Financial Responsibility:  I agree that in consideration of the services and   supplies that have been   or will be furnished to the patient, I am hereby obligated to pay all charges made for or on the account of the patient according to the standard rates (in effect at the time the services and supplies are delivered) established by [facility], including its Patient Financial Assistance Policy to the extent it is applicable. I understand that I am responsible for all charges, or portions thereof, not covered by insurance or other sources. Patient refunds will be distributed only after balances at all [facility] facilities are paid.     H. Communication Authorization:  I hereby authorize [facility] and its representatives, along with any billing service   or  who may work on their behalf, to contact me on   my cell phone and/or home phone using pre- recorded messages, artificial voice messages, automatic telephone dialing devices or other computer assisted technology, or by electronic      mail, text messaging, or by any other form of electronic communication. This includes, but is not limited to, appointment reminders, yearly physical exam reminders, preventive care reminders, patient campaigns, welcome calls, and calls about account balances on my account or any account on which I am listed as a guarantor. I understand I have the right to opt out of these communications at any time.      Relationship  Between  Facility and  Provider:      I understand that some, but not all, providers furnishing services to the patient are not employees or agents of [facility].  The patient is under the care and supervision of his/her attending physician, and it is the responsibility of the facility and its nursing staff to carry out the instructions of such physicians. It is the responsibility of the patient's physician/designee to obtain the patient's informed consent, when required, for medical or surgical treatment, special diagnostic or therapeutic procedures, or hospital services rendered for the patient under the special instructions of the physician/designee.           REGISTRATION AUTHORIZATION  Form No. 37723 (Rev. 3/25/2024)    Page 2 of 3                       Immunizations: [facility] shares immunization information with state sponsored health departments to help you and your doctor keep track of your immunization records. By signing, you consent to have this information shared with the health department in your state:                                [place] - LINKS ([place] Immunization Network for Kids Statewide)                                [place] - MIIX ([place] Immunization Information eXchange)                                [place] - ImmPRINT (Immunization Patient Registry with Integrated Technology)     TERM: This authorization is valid for this and subsequent care/treatment I receive at [facility] and will remain valid unless/until revoked in writing by me.     [facility]: As used in this document, [facility] means all [facility] owned and managed facilities, including, but not limited to, all health centers, surgery centers, clinics, urgent care centers, and hospitals.         [facility] complies with applicable Federal civil rights laws and does not discriminate on the basis of race, color, national origin, age, disability, or sex.  ATENCIÓN: si habla español, tiene a correa disposición servicios gratuitos de asistencia lingüística. Merary spangler [phone].  MARCOS Ý: N?u b?n nói Ti?ng Vi?t, có các d?ch v? h? tr? ngôn ng? mi?n phí dành cho  b?n. G?i s? [phone].        REGISTRATION AUTHORIZATION  Form No. 68261 (Rev. 3/25/2024)   Page 3 of 3     Patient

## 2025-04-25 NOTE — PROGRESS NOTES
Population Health Chart Review & Patient Outreach Details        Health Maintenance Topics Addressed and Outreach Outcomes / Actions Taken:  Diabetic Eye Exam [x] MAI faxed to [facility] for eye exam. 2nd Request.

## 2025-04-28 LAB
OHS QRS DURATION: 100 MS
OHS QTC CALCULATION: 451 MS

## 2025-04-30 ENCOUNTER — PATIENT OUTREACH (OUTPATIENT)
Facility: HOSPITAL | Age: 75
End: 2025-04-30
Payer: MEDICARE

## 2025-04-30 NOTE — PROGRESS NOTES
Population Health Chart Review & Patient Outreach Details        Health Maintenance Topics Addressed and Outreach Outcomes / Actions Taken:  Breast Cancer Screening [x] HM updated with eye exam by Dr. Mauricio on 11/4/2024.

## 2025-05-05 ENCOUNTER — TELEPHONE (OUTPATIENT)
Dept: ORTHOPEDICS | Facility: CLINIC | Age: 75
End: 2025-05-05
Payer: MEDICARE

## 2025-05-05 DIAGNOSIS — M89.8X2 PAIN OF RIGHT HUMERUS: Primary | ICD-10-CM

## 2025-05-05 NOTE — TELEPHONE ENCOUNTER
----- Message from Tech Laturina sent at 5/2/2025  3:29 PM CDT -----  Who Called: DARLING (DAUGHTER) Caller is requesting assistance/information from provider's office.Symptoms (please be specific): hand swollen How long has patient had these symptoms: 3 days Preferred Method of Contact: Phone CallPatient's Preferred Phone Number on File: [phone] Best Call Back Number, if different:[phone] Information: patient hand is swollen and the daughter wanted to know if there was anything she needed to be doing as far as exercising, because patient has a sling on her arm.

## 2025-05-05 NOTE — TELEPHONE ENCOUNTER
Tried calling patient's daughter. No answer but left voicemail message letting her know patient needs to prop up hand/arm and use ice therapy to help with swelling. Let her know appt is tomorrow with EILEEN Tomlinson who will assess everything and let them know about further exercises, and if they had any questions to return call.

## 2025-05-06 ENCOUNTER — OFFICE VISIT (OUTPATIENT)
Dept: ORTHOPEDICS | Facility: CLINIC | Age: 75
End: 2025-05-06
Payer: MEDICARE

## 2025-05-06 ENCOUNTER — HOSPITAL ENCOUNTER (OUTPATIENT)
Dept: RADIOLOGY | Facility: HOSPITAL | Age: 75
Discharge: HOME | End: 2025-05-06
Payer: MEDICARE

## 2025-05-06 VITALS — HEIGHT: 69 IN | WEIGHT: 179.88 LBS | BODY MASS INDEX: 26.64 KG/M2

## 2025-05-06 DIAGNOSIS — M89.8X2 PAIN OF RIGHT HUMERUS: ICD-10-CM

## 2025-05-06 DIAGNOSIS — S42.391A OTHER CLOSED FRACTURE OF SHAFT OF RIGHT HUMERUS, INITIAL ENCOUNTER: ICD-10-CM

## 2025-05-06 PROCEDURE — 73060 X-RAY EXAM OF HUMERUS: CPT | Mod: 26,RT,, | Performed by: RADIOLOGY

## 2025-05-06 PROCEDURE — 99213 OFFICE O/P EST LOW 20 MIN: CPT | Mod: PBBFAC,25

## 2025-05-06 PROCEDURE — 99024 POSTOP FOLLOW-UP VISIT: CPT | Mod: ,,,

## 2025-05-06 PROCEDURE — 99999 PR PBB SHADOW E&M-EST. PATIENT-LVL III: CPT | Mod: PBBFAC,,,

## 2025-05-06 PROCEDURE — 73060 X-RAY EXAM OF HUMERUS: CPT | Mod: TC,RT

## 2025-05-06 RX ORDER — HYDROCODONE BITARTRATE AND ACETAMINOPHEN 5; 325 MG/1; MG/1
1 TABLET ORAL EVERY 6 HOURS PRN
Qty: 28 TABLET | Refills: 0 | Status: SHIPPED | OUTPATIENT
Start: 2025-05-06 | End: 2025-05-13

## 2025-05-09 NOTE — PROGRESS NOTES
Insertion, Intramedullary Johnna, Humerus - Right 4/24/2025    Luna Wolf is a 74 y.o. female seen today for post-op visit.  Patient is 12 days status post insertion of IM johnna right humerus by Dr. Campos.  Patient presents today with sling in place.  She reports pain.  No other complaints today.      PAST MEDICAL HISTORY:   Past Medical History:   Diagnosis Date    Breast cancer in female     Cancer     Diabetes mellitus     Hypertension     Mixed hyperlipidemia         PAST SURGICAL HISTORY:   Past Surgical History:   Procedure Laterality Date    BREAST BIOPSY      CERVICAL DISC SURGERY      INTRAMEDULLARY RODDING OF HUMERUS Right 4/24/2025    Procedure: INSERTION, INTRAMEDULLARY JOHNNA, HUMERUS;  Surgeon: Zach Campos MD;  Location: [facility];  Service: Orthopedics;  Laterality: Right;    TUBAL LIGATION          MEDICATIONS: Current Medications[1]       PHYSICAL EXAM:      GENERAL: Well-developed, well-nourished female . The patient is alert, oriented and cooperative.    EXTREMITIES:  Right upper extremity skin clean dry and intact, few blisters around elbow, limited range of motion on exam today due to pain, neurovascularly intact    WOUND: Incisions are clean,dry,intact without signs of infection and healing well      RADIOGRAPHIC FINDINGS:   X-Ray Humerus 2 View Right  Result Date: 5/7/2025  EXAMINATION: XR HUMERUS 2 VIEW RIGHT CLINICAL HISTORY: Other specified disorders of bone, upper arm COMPARISON: 04/24/2025 FINDINGS: Internally fixated healing right humerus fracture shows stable alignment compared to prior.  No significant interval changes.  Skin staples overlie the operative site.  Arthritic changes of the right shoulder.     No significant interval change compared to 05/02/2025. Electronically signed by: Steve Tang Date:    05/07/2025 Time:    14:41    X-Ray Humerus 2 View Right  Result Date: 5/2/2025  EXAMINATION: XR HUMERUS 2 VIEW RIGHT CLINICAL HISTORY: ORIF humerus  fracture; TECHNIQUE: Two views of the right humerus were obtained. COMPARISON: Prior dated 04/25/2025 FINDINGS: There is been interval placement of an intramedullary jony and screw fixation device in the right humerus.  Alignment is near anatomic.  Fracture line remains visible.  Overlying skin staples are present and there is associated soft tissue swelling. There is degenerative change of the shoulder.     See above. Electronically signed by: Tracy Saunders MD Date:    05/02/2025 Time:    12:59    X-Ray Chest 1 View  Result Date: 4/25/2025  EXAMINATION: XR CHEST 1 VIEW CLINICAL HISTORY: Encounter for other preprocedural examination TECHNIQUE: Single frontal view of the chest was performed. COMPARISON: Chest x-ray of April 22, 2025 FINDINGS: The lungs are clear, with normal appearance of pulmonary vasculature and no pleural effusion or pneumothorax. The cardiac silhouette is normal in size. The hilar and mediastinal contours are unremarkable. Bones are intact.     No acute abnormality. Electronically signed by: Zach Rosen MD Date:    04/25/2025 Time:    15:29    FL Limited Non-Billable  Result Date: 4/24/2025  See Physician's Notes for results. IMPRESSION: See Physician's Notes for results This procedure was auto-finalized  See Physicians Notes for your results.    X-Ray Lumbar Spine AP And Lateral  Result Date: 4/22/2025  EXAMINATION: XR LUMBAR SPINE AP AND LATERAL CLINICAL HISTORY: Lumbago with sciatica, left side TECHNIQUE: AP, lateral and spot images were performed of the lumbar spine. COMPARISON: None FINDINGS: Alignment: Grade 1 anterolisthesis noted at L4-L5 and L5-S1. Vertebrae: Vertebral body heights are maintained.  No suspicious appearing lytic or blastic lesions. Discs and facets: Moderate disc height loss at L4-S1. Lower lumbar facet arthropathy. Miscellaneous: Degenerative changes are seen at both hips.  Vascular calcifications noted.     As above. Electronically signed by: Binh Camara MD  Date:    04/22/2025 Time:    11:32    X-Ray Hand 3 view Right  Result Date: 4/22/2025  EXAMINATION: XR HAND COMPLETE 3 VIEW RIGHT CLINICAL HISTORY: fall; TECHNIQUE: PA, lateral, and oblique views of the right hand were performed. COMPARISON: 01/26/2023 FINDINGS: No fracture or dislocation.  There are advanced arthritic changes, most severe at the base of thumb, index DIP and radiocarpal joints.  Chondrocalcinosis noted about the TFCC.     As above. Electronically signed by: Binh Camara MD Date:    04/22/2025 Time:    11:26    X-Ray Forearm Right  Result Date: 4/22/2025  EXAMINATION: XR FOREARM RIGHT CLINICAL HISTORY: Unspecified injury of shoulder and upper arm, unspecified arm, initial encounter TECHNIQUE: Right forearm, two views COMPARISON: None FINDINGS: No fracture.  No lytic or blastic lesion.  Advanced arthritic changes are seen at the wrist and base of thumb with chondrocalcinosis.     As above. Electronically signed by: Binh Camara MD Date:    04/22/2025 Time:    11:26    X-Ray Humerus 2 View Right  Result Date: 4/22/2025  EXAMINATION: XR HUMERUS 2 VIEW RIGHT CLINICAL HISTORY: Unspecified injury of shoulder and upper arm, unspecified arm, initial encounter TECHNIQUE: AP and lateral views of right humerus. COMPARISON: None FINDINGS: Displaced fracture noted at the mid shaft of the right humerus.  No evidence for lytic or blastic lesion. High-riding humeral head in keeping with rotator cuff tear.  Advanced acromioclavicular arthropathy noted. Surgical clips noted in the right chest.     As above. Electronically signed by: Binh Camara MD Date:    04/22/2025 Time:    11:25    X-Ray Chest 1 View  Result Date: 4/22/2025  EXAMINATION: XR CHEST 1 VIEW CLINICAL HISTORY: Unspecified fall, initial encounter TECHNIQUE: Single frontal view of the chest was performed. COMPARISON: None FINDINGS: No consolidation, pleural effusion or pneumothorax.  There is perihilar interstitial prominence, perhaps pulmonary  "edema.  Cardiac silhouette is enlarged.  Surgical clips noted in the right chest.     As above. Electronically signed by: Binh Camara MD Date:    04/22/2025 Time:    11:24       Patient Active Problem List    Diagnosis Date Noted    Closed displaced oblique fracture of shaft of right humerus 04/23/2025    Stage 3b chronic kidney disease 03/01/2024    Type 2 diabetes mellitus, with long-term current use of insulin 08/28/2023    Essential hypertension 08/28/2023    Mixed hyperlipidemia 08/28/2023    Cervical spinal stenosis 08/28/2023    Hx of colonic polyps 12/28/2022    Diverticulosis of colon 12/28/2022     IMPRESSION AND PLAN: Patient is status-post Insertion, Intramedullary Jose Cruz, Humerus - Right improving. All incisional sutures/staples removed today and replaced with Steri-Strips, discussed that these can get wet in the shower in 2-3 days, let them fall off on their own.  Personally reviewed x-rays of right humerus today showing proper alignment of both fracture and hardware with continued healing compared to previous images.  Discussed continuation of shoulder immobilization, okay for gentle range of motion of her elbow.  Follow up Dr. Campos 3 weeks.    No follow-ups on file.       Vi Mendes PA-C      (Subject to voice recognition error, transcription service not allowed)         [1]   Current Outpatient Medications:     ACCU-CHEK GAY PLUS TEST STRP Strp, For home blood glucose monitoring three times daily. Dx: Type II DM E11.9, Disp: 300 strip, Rfl: 3    aspirin (ECOTRIN) 81 MG EC tablet, Take 81 mg by mouth once daily., Disp: , Rfl:     ATACAND HCT 32-25 mg Tab, Take 1 tablet by mouth once daily., Disp: 90 each, Rfl: 1    BD ULTRA-FINE MINI PEN NEEDLE 31 gauge x 3/16" Ndle, USE AS DIRECTED WITH VICTOZA, Disp: 100 each, Rfl: 5    blood sugar diagnostic Strp, For 3 times daily home blood glucose monitoring Dx: Type II DM with hyperglycemia Insulin Dependent E11.65, Disp: 270 strip, Rfl: 3    " blood sugar diagnostic Strp, To check BG 1 times daily, to use with insurance preferred meter, Disp: 100 strip, Rfl: 5    blood-glucose meter kit, For 3 times daily home blood glucose monitoring Dx: Type II DM with hyperglycemia Insulin Dependent E11.65, Disp: 1 each, Rfl: 0    fexofenadine (ALLEGRA ALLERGY) 60 MG tablet, Take 1 tablet (60 mg total) by mouth daily as needed (allergies)., Disp: 90 tablet, Rfl: 1    fluticasone propionate (FLONASE) 50 mcg/actuation nasal spray, 1 spray (50 mcg total) by Each Nostril route 2 (two) times daily as needed for Rhinitis., Disp: 16 g, Rfl: 0    glipiZIDE (GLUCOTROL) 10 MG tablet, Take 1 tablet (10 mg total) by mouth 2 (two) times daily before meals., Disp: 180 tablet, Rfl: 1    HYDROcodone-acetaminophen (NORCO) 5-325 mg per tablet, Take 1 tablet by mouth every 6 (six) hours as needed for Pain., Disp: 28 tablet, Rfl: 0    montelukast (SINGULAIR) 10 mg tablet, Take 1 tablet (10 mg total) by mouth once daily., Disp: 90 tablet, Rfl: 1    rosuvastatin (CRESTOR) 10 MG tablet, Take 1 tablet (10 mg total) by mouth every evening., Disp: 90 tablet, Rfl: 1    semaglutide (OZEMPIC) 0.25 mg or 0.5 mg (2 mg/3 mL) pen injector, Inject 0.5 mg into the skin every 7 days., Disp: 9 mL, Rfl: 1    TRESIBA FLEXTOUCH U-200 200 unit/mL (3 mL) insulin pen, Inject 56 Units into the skin once daily., Disp: 9 Pen, Rfl: 3

## 2025-06-03 DIAGNOSIS — S42.391A OTHER CLOSED FRACTURE OF SHAFT OF RIGHT HUMERUS, INITIAL ENCOUNTER: Primary | ICD-10-CM

## 2025-06-04 ENCOUNTER — HOSPITAL ENCOUNTER (OUTPATIENT)
Dept: RADIOLOGY | Facility: HOSPITAL | Age: 75
Discharge: HOME | End: 2025-06-04
Attending: ORTHOPAEDIC SURGERY
Payer: MEDICARE

## 2025-06-04 ENCOUNTER — TELEPHONE (OUTPATIENT)
Dept: ORTHOPEDICS | Facility: CLINIC | Age: 75
End: 2025-06-04
Payer: MEDICARE

## 2025-06-04 ENCOUNTER — OFFICE VISIT (OUTPATIENT)
Dept: ORTHOPEDICS | Facility: CLINIC | Age: 75
End: 2025-06-04
Payer: MEDICARE

## 2025-06-04 VITALS — BODY MASS INDEX: 26.74 KG/M2 | HEIGHT: 69 IN | WEIGHT: 180.56 LBS

## 2025-06-04 DIAGNOSIS — S42.391A OTHER CLOSED FRACTURE OF SHAFT OF RIGHT HUMERUS, INITIAL ENCOUNTER: ICD-10-CM

## 2025-06-04 DIAGNOSIS — S42.331D CLOSED DISPLACED OBLIQUE FRACTURE OF SHAFT OF RIGHT HUMERUS WITH ROUTINE HEALING, SUBSEQUENT ENCOUNTER: Primary | ICD-10-CM

## 2025-06-04 PROCEDURE — 1159F MED LIST DOCD IN RCRD: CPT | Mod: CPTII,,, | Performed by: ORTHOPAEDIC SURGERY

## 2025-06-04 PROCEDURE — 99999 PR PBB SHADOW E&M-EST. PATIENT-LVL IV: CPT | Mod: PBBFAC,,, | Performed by: ORTHOPAEDIC SURGERY

## 2025-06-04 PROCEDURE — 99214 OFFICE O/P EST MOD 30 MIN: CPT | Mod: PBBFAC,25 | Performed by: ORTHOPAEDIC SURGERY

## 2025-06-04 PROCEDURE — 73060 X-RAY EXAM OF HUMERUS: CPT | Mod: 26,RT,, | Performed by: ORTHOPAEDIC SURGERY

## 2025-06-04 PROCEDURE — 3288F FALL RISK ASSESSMENT DOCD: CPT | Mod: CPTII,,, | Performed by: ORTHOPAEDIC SURGERY

## 2025-06-04 PROCEDURE — 73060 X-RAY EXAM OF HUMERUS: CPT | Mod: TC,RT

## 2025-06-04 PROCEDURE — 1100F PTFALLS ASSESS-DOCD GE2>/YR: CPT | Mod: CPTII,,, | Performed by: ORTHOPAEDIC SURGERY

## 2025-06-04 PROCEDURE — 1160F RVW MEDS BY RX/DR IN RCRD: CPT | Mod: CPTII,,, | Performed by: ORTHOPAEDIC SURGERY

## 2025-06-04 PROCEDURE — 99024 POSTOP FOLLOW-UP VISIT: CPT | Mod: ,,, | Performed by: ORTHOPAEDIC SURGERY

## 2025-06-24 ENCOUNTER — OFFICE VISIT (OUTPATIENT)
Dept: FAMILY MEDICINE | Facility: CLINIC | Age: 75
End: 2025-06-24
Payer: MEDICARE

## 2025-06-24 VITALS
TEMPERATURE: 99 F | HEIGHT: 69 IN | WEIGHT: 175.19 LBS | DIASTOLIC BLOOD PRESSURE: 61 MMHG | SYSTOLIC BLOOD PRESSURE: 100 MMHG | HEART RATE: 89 BPM | BODY MASS INDEX: 25.95 KG/M2

## 2025-06-24 DIAGNOSIS — J30.2 SEASONAL ALLERGIES: ICD-10-CM

## 2025-06-24 DIAGNOSIS — N18.32 STAGE 3B CHRONIC KIDNEY DISEASE: ICD-10-CM

## 2025-06-24 DIAGNOSIS — Z99.89 AMBULATES WITH CANE: ICD-10-CM

## 2025-06-24 DIAGNOSIS — E78.2 MIXED HYPERLIPIDEMIA: ICD-10-CM

## 2025-06-24 DIAGNOSIS — E11.69 TYPE 2 DIABETES MELLITUS WITH OTHER SPECIFIED COMPLICATION, WITH LONG-TERM CURRENT USE OF INSULIN: Primary | ICD-10-CM

## 2025-06-24 DIAGNOSIS — I10 ESSENTIAL HYPERTENSION: ICD-10-CM

## 2025-06-24 DIAGNOSIS — R54 FRAILTY: ICD-10-CM

## 2025-06-24 DIAGNOSIS — Z79.4 TYPE 2 DIABETES MELLITUS WITH OTHER SPECIFIED COMPLICATION, WITH LONG-TERM CURRENT USE OF INSULIN: Primary | ICD-10-CM

## 2025-06-24 PROCEDURE — 3288F FALL RISK ASSESSMENT DOCD: CPT | Mod: ,,, | Performed by: FAMILY MEDICINE

## 2025-06-24 PROCEDURE — 3074F SYST BP LT 130 MM HG: CPT | Mod: ,,, | Performed by: FAMILY MEDICINE

## 2025-06-24 PROCEDURE — 1101F PT FALLS ASSESS-DOCD LE1/YR: CPT | Mod: ,,, | Performed by: FAMILY MEDICINE

## 2025-06-24 PROCEDURE — 1159F MED LIST DOCD IN RCRD: CPT | Mod: ,,, | Performed by: FAMILY MEDICINE

## 2025-06-24 PROCEDURE — 3078F DIAST BP <80 MM HG: CPT | Mod: ,,, | Performed by: FAMILY MEDICINE

## 2025-06-24 PROCEDURE — 99214 OFFICE O/P EST MOD 30 MIN: CPT | Mod: ,,, | Performed by: FAMILY MEDICINE

## 2025-06-24 PROCEDURE — 82570 ASSAY OF URINE CREATININE: CPT | Mod: ,,, | Performed by: CLINICAL MEDICAL LABORATORY

## 2025-06-24 PROCEDURE — 82043 UR ALBUMIN QUANTITATIVE: CPT | Mod: ,,, | Performed by: CLINICAL MEDICAL LABORATORY

## 2025-06-24 RX ORDER — BLOOD SUGAR DIAGNOSTIC
STRIP MISCELLANEOUS
Qty: 300 STRIP | Refills: 3 | Status: SHIPPED | OUTPATIENT
Start: 2025-06-24

## 2025-06-24 RX ORDER — LANCETS
1 EACH MISCELLANEOUS
Qty: 300 EACH | Refills: 3 | Status: SHIPPED | OUTPATIENT
Start: 2025-06-24

## 2025-06-24 RX ORDER — ROSUVASTATIN CALCIUM 10 MG/1
10 TABLET, COATED ORAL NIGHTLY
Qty: 90 TABLET | Refills: 1 | Status: SHIPPED | OUTPATIENT
Start: 2025-06-24

## 2025-06-24 RX ORDER — FEXOFENADINE HCL 60 MG/1
60 TABLET, FILM COATED ORAL DAILY PRN
Qty: 90 TABLET | Refills: 1 | Status: SHIPPED | OUTPATIENT
Start: 2025-06-24 | End: 2026-06-24

## 2025-06-24 RX ORDER — CANDESARTAN CILEXETIL AND HYDROCHLOROTHIAZIDE 32; 25 MG/1; MG/1
1 TABLET ORAL DAILY
Qty: 90 EACH | Refills: 1 | Status: SHIPPED | OUTPATIENT
Start: 2025-06-24

## 2025-06-24 NOTE — PROGRESS NOTES
"Clinic Note    Patient Name: Luna Wolf  : 1950  MRN: [id number]    Chief Complaint   Patient presents with    Follow-up     Pt reports she broke her right arm above the elbow.     Extremity Weakness     Pt reports both of her legs feel "heavy"     Health Maintenance     TETANUS VACCINE Never done  Shingles Vaccine(1 of 2) Never done  Pneumococcal Vaccines (Age 50+)(2 of 2 - PCV) due on 2015  RSV Vaccine (Age 60+ and Pregnant patients)(1 - 1-dose 75+ series) Never done  COVID-19 Vaccine(2024- season) due on 2024  DEXA Scan due on 2025  Hemoglobin A1c due on 2025        HPI:    Ms. Luna Wolf is a 74 y.o. female who presents to clinic today with CC of follow up on chronic disease processes including Type II DM, HTN, HLD, CKD, and h/o cervical spinal stenosis. She previously reported weakness in legs and legs feeling "heavy". She was subsequently referred to pain management and PT. She also had an XR of her lumbar spine.  However, she has since broken her R humerus. She has been following with Ortho (Dr. Campos) for this issue. Patient reports she is still having pain in this arm from her fall. Reports she also has a tear in her rotator cuff. States she has an appt to follow up with Ortho next month.   Patient reports she did not get to go to PT or pain management due to fall and fracture in R humerus requiring surgery. She declined, however, to get this rescheduled at this time. She reports that she wants to get her arm and shoulder healed before she addresses this issue.  Patient reports chronic issues are, otherwise, well controlled on current medication regimen.  Denies problems or side effects with medications.  Patient is, otherwise, without complaints.     Medications:  Medication List with Changes/Refills   New Medications    LANCETS (ACCU-CHEK SOFTCLIX LANCETS) MISC    1 each by Misc.(Non-Drug; Combo Route) route as needed (AS NEEDED). Dx: Type II DM E11.9   Current " "Medications    ASPIRIN (ECOTRIN) 81 MG EC TABLET    Take 81 mg by mouth once daily.    BD ULTRA-FINE MINI PEN NEEDLE 31 GAUGE X 3/16" NDLE    USE AS DIRECTED WITH VICTOZA    BLOOD SUGAR DIAGNOSTIC STRP    For 3 times daily home blood glucose monitoring Dx: Type II DM with hyperglycemia Insulin Dependent E11.65    BLOOD SUGAR DIAGNOSTIC STRP    To check BG 1 times daily, to use with insurance preferred meter    BLOOD-GLUCOSE METER KIT    For 3 times daily home blood glucose monitoring Dx: Type II DM with hyperglycemia Insulin Dependent E11.65    FLUTICASONE PROPIONATE (FLONASE) 50 MCG/ACTUATION NASAL SPRAY    1 spray (50 mcg total) by Each Nostril route 2 (two) times daily as needed for Rhinitis.    GLIPIZIDE (GLUCOTROL) 10 MG TABLET    Take 1 tablet (10 mg total) by mouth 2 (two) times daily before meals.    MONTELUKAST (SINGULAIR) 10 MG TABLET    Take 1 tablet (10 mg total) by mouth once daily.    SEMAGLUTIDE (OZEMPIC) 0.25 MG OR 0.5 MG (2 MG/3 ML) PEN INJECTOR    Inject 0.5 mg into the skin every 7 days.    TRESIBA FLEXTOUCH U-200 200 UNIT/ML (3 ML) INSULIN PEN    Inject 56 Units into the skin once daily.   Changed and/or Refilled Medications    Modified Medication Previous Medication    ACCU-CHEK GAY PLUS TEST STRP STRP ACCU-CHEK GAY PLUS TEST STRP Strp       For home blood glucose monitoring three times daily. Dx: Type II DM E11.9    For home blood glucose monitoring three times daily. Dx: Type II DM E11.9    ATACAND HCT 32-25 MG TAB ATACAND HCT 32-25 mg Tab       Take 1 tablet by mouth once daily.    Take 1 tablet by mouth once daily.    FEXOFENADINE (ALLEGRA ALLERGY) 60 MG TABLET fexofenadine (ALLEGRA ALLERGY) 60 MG tablet       Take 1 tablet (60 mg total) by mouth daily as needed (allergies).    Take 1 tablet (60 mg total) by mouth daily as needed (allergies).    ROSUVASTATIN (CRESTOR) 10 MG TABLET rosuvastatin (CRESTOR) 10 MG tablet       Take 1 tablet (10 mg total) by mouth every evening.    Take 1 " "tablet (10 mg total) by mouth every evening.        Allergies: Amlodipine, Candesartan, Pravastatin, Ibuprofen, and Metformin      Past Medical History:    Past Medical History:   Diagnosis Date    Breast cancer in female     Cancer     Diabetes mellitus     Hypertension     Mixed hyperlipidemia        Past Surgical History:    Past Surgical History:   Procedure Laterality Date    BREAST BIOPSY      CERVICAL DISC SURGERY      INTRAMEDULLARY RODDING OF HUMERUS Right 4/24/2025    Procedure: INSERTION, INTRAMEDULLARY JOHNNA, HUMERUS;  Surgeon: Zach Campos MD;  Location: [facility];  Service: Orthopedics;  Laterality: Right;    TUBAL LIGATION           Social History:    Tobacco Use History[1]  Social History     Substance and Sexual Activity   Alcohol Use Never     Social History     Substance and Sexual Activity   Drug Use Never         Family History:    Family History   Problem Relation Name Age of Onset    Hypertension Brother      Diabetes Brother         Review of Systems:    Review of Systems   Constitutional:  Negative for appetite change, chills, fatigue, fever and unexpected weight change.   Eyes:  Negative for visual disturbance.   Respiratory:  Negative for cough and shortness of breath.    Cardiovascular:  Negative for chest pain and leg swelling.   Gastrointestinal:  Negative for abdominal pain, blood in stool, change in bowel habit, constipation, diarrhea, nausea and vomiting.        Reports chronic intermittent issues with constipation improved with diet changes   Musculoskeletal:  Positive for arthralgias.   Integumentary:  Negative for rash.   Neurological:  Negative for dizziness and headaches.   Psychiatric/Behavioral:  The patient is not nervous/anxious.         Vitals:    Vitals:    06/24/25 1259   BP: 100/61   BP Location: Left arm   Patient Position: Sitting   Pulse: 89   Temp: 98.6 °F (37 °C)   TempSrc: Oral   Weight: 79.5 kg (175 lb 3.2 oz)   Height: 5' 9" (1.753 m)       Body " mass index is 25.87 kg/m².    Wt Readings from Last 3 Encounters:   06/24/25 1259 79.5 kg (175 lb 3.2 oz)   06/04/25 0947 81.9 kg (180 lb 8.9 oz)   05/06/25 1022 81.6 kg (179 lb 14.3 oz)        Physical Exam:    Physical Exam  Constitutional:       General: She is not in acute distress.     Appearance: Normal appearance.   HENT:      Nose: Nose normal.      Mouth/Throat:      Mouth: Mucous membranes are moist.      Pharynx: Oropharynx is clear.   Eyes:      Conjunctiva/sclera: Conjunctivae normal.   Cardiovascular:      Rate and Rhythm: Normal rate and regular rhythm.      Heart sounds: Normal heart sounds. No murmur heard.  Pulmonary:      Effort: Pulmonary effort is normal. No respiratory distress.      Breath sounds: Normal breath sounds. No wheezing, rhonchi or rales.   Abdominal:      General: Bowel sounds are normal.      Palpations: Abdomen is soft.      Tenderness: There is no abdominal tenderness.   Musculoskeletal:      Cervical back: Neck supple.      Right lower leg: No edema.      Left lower leg: No edema.      Comments: + decreased ROM R shoulder/arm secondary to fall with fracture of R humerus and rotator cuff injury - she is following with Ortho (Dr. Campos)    Skin:     Findings: No rash.   Neurological:      General: No focal deficit present.      Mental Status: She is alert. Mental status is at baseline.      Gait: Gait abnormal.      Comments: Ambulates with assistance of cane   Psychiatric:         Mood and Affect: Mood normal.           Assessment/Plan:   1. Type 2 diabetes mellitus with other specified complication, with long-term current use of insulin  -     ACCU-CHEK GAY PLUS TEST STRP Strp; For home blood glucose monitoring three times daily. Dx: Type II DM E11.9  Dispense: 300 strip; Refill: 3  -     lancets (ACCU-CHEK SOFTCLIX LANCETS) Misc; 1 each by Misc.(Non-Drug; Combo Route) route as needed (AS NEEDED). Dx: Type II DM E11.9  Dispense: 300 each; Refill: 3  -     CBC Auto  Differential; Future; Expected date: 06/24/2025  -     Comprehensive Metabolic Panel; Future; Expected date: 06/24/2025  -     Lipid Panel; Future; Expected date: 06/24/2025  -     Hemoglobin A1C; Future; Expected date: 06/24/2025  -     Microalbumin/Creatinine Ratio, Urine    2. Essential hypertension  -     ATACAND HCT 32-25 mg Tab; Take 1 tablet by mouth once daily.  Dispense: 90 each; Refill: 1  -     CBC Auto Differential; Future; Expected date: 06/24/2025  -     Comprehensive Metabolic Panel; Future; Expected date: 06/24/2025  -     Lipid Panel; Future; Expected date: 06/24/2025  -     Microalbumin/Creatinine Ratio, Urine    3. Seasonal allergies  -     fexofenadine (ALLEGRA ALLERGY) 60 MG tablet; Take 1 tablet (60 mg total) by mouth daily as needed (allergies).  Dispense: 90 tablet; Refill: 1    4. Mixed hyperlipidemia  -     rosuvastatin (CRESTOR) 10 MG tablet; Take 1 tablet (10 mg total) by mouth every evening.  Dispense: 90 tablet; Refill: 1  -     CBC Auto Differential; Future; Expected date: 06/24/2025  -     Comprehensive Metabolic Panel; Future; Expected date: 06/24/2025  -     Lipid Panel; Future; Expected date: 06/24/2025    5. Stage 3b chronic kidney disease  -     CBC Auto Differential; Future; Expected date: 06/24/2025  -     Comprehensive Metabolic Panel; Future; Expected date: 06/24/2025  -     Microalbumin/Creatinine Ratio, Urine    6. Frailty    7. Ambulates with cane         Active Problem List with Overview Notes    Diagnosis Date Noted    Closed displaced oblique fracture of shaft of right humerus 04/23/2025    Stage 3b chronic kidney disease 03/01/2024    Type 2 diabetes mellitus, with long-term current use of insulin 08/28/2023    Essential hypertension 08/28/2023    Mixed hyperlipidemia 08/28/2023    Cervical spinal stenosis 08/28/2023     S/p surgery with Dr. Wagoner (Neurosugery) in [place], [place]      Hx of colonic polyps 12/28/2022    Diverticulosis of colon 12/28/2022        Health  Maintenance:  Health Maintenance   Topic Date Due    TETANUS VACCINE  Never done    Shingles Vaccine (1 of 2) Never done    Pneumococcal Vaccines (Age 50+) (2 of 2 - PCV) 04/30/2015    RSV Vaccine (Age 60+ and Pregnant patients) (1 - 1-dose 75+ series) Never done    COVID-19 Vaccine (5 - 2024-25 season) 09/01/2024    DEXA Scan  04/11/2025    Hemoglobin A1c  06/11/2025    Diabetes Urine Screening  08/28/2025    Mammogram  10/11/2025    Diabetic Eye Exam  11/04/2025    Lipid Panel  03/11/2026    Hepatitis C Screening  Completed    Influenza Vaccine  Addressed       RTC in 3 months for chronic follow up.  RTC sooner if needed.   Patient voiced understanding and is agreeable to plan.      Kerri Mcdaniels MD    Family Medicine           [1]   Social History  Tobacco Use   Smoking Status Never    Passive exposure: Never   Smokeless Tobacco Never      Suturegard Body: The suture ends were repeatedly re-tightened and re-clamped to achieve the desired tissue expansion.

## 2025-06-25 LAB
ALBUMIN SERPL BCP-MCNC: 4 G/DL (ref 3.4–4.8)
ALBUMIN/GLOB SERPL: 1.2 {RATIO}
ALP SERPL-CCNC: 163 U/L (ref 40–150)
ALT SERPL W P-5'-P-CCNC: 13 U/L
ANION GAP SERPL CALCULATED.3IONS-SCNC: 16 MMOL/L (ref 7–16)
AST SERPL W P-5'-P-CCNC: 19 U/L (ref 11–45)
BASOPHILS # BLD AUTO: 0.06 K/UL (ref 0–0.2)
BASOPHILS NFR BLD AUTO: 0.9 % (ref 0–1)
BILIRUB SERPL-MCNC: 0.9 MG/DL
BUN SERPL-MCNC: 51 MG/DL (ref 10–20)
BUN/CREAT SERPL: 17 (ref 6–20)
CALCIUM SERPL-MCNC: 10 MG/DL (ref 8.4–10.2)
CHLORIDE SERPL-SCNC: 92 MMOL/L (ref 98–107)
CHOLEST SERPL-MCNC: 88 MG/DL
CHOLEST/HDLC SERPL: 2.8 {RATIO}
CO2 SERPL-SCNC: 23 MMOL/L (ref 23–31)
CREAT SERPL-MCNC: 3.05 MG/DL (ref 0.55–1.02)
CREAT UR-MCNC: 174 MG/DL (ref 15–325)
DIFFERENTIAL METHOD BLD: ABNORMAL
EGFR (NO RACE VARIABLE) (RUSH/TITUS): 15 ML/MIN/1.73M2
EOSINOPHIL # BLD AUTO: 0.16 K/UL (ref 0–0.5)
EOSINOPHIL NFR BLD AUTO: 2.5 % (ref 1–4)
ERYTHROCYTE [DISTWIDTH] IN BLOOD BY AUTOMATED COUNT: 12.9 % (ref 11.5–14.5)
GLOBULIN SER-MCNC: 3.3 G/DL (ref 2–4)
GLUCOSE SERPL-MCNC: 697 MG/DL (ref 82–115)
HCT VFR BLD AUTO: 34.8 % (ref 38–47)
HDLC SERPL-MCNC: 32 MG/DL (ref 35–60)
HGB BLD-MCNC: 11 G/DL (ref 12–16)
IMM GRANULOCYTES # BLD AUTO: 0.04 K/UL (ref 0–0.04)
IMM GRANULOCYTES NFR BLD: 0.6 % (ref 0–0.4)
LDLC SERPL CALC-MCNC: 21 MG/DL
LYMPHOCYTES # BLD AUTO: 1.58 K/UL (ref 1–4.8)
LYMPHOCYTES NFR BLD AUTO: 25 % (ref 27–41)
MCH RBC QN AUTO: 26.9 PG (ref 27–31)
MCHC RBC AUTO-ENTMCNC: 31.6 G/DL (ref 32–36)
MCV RBC AUTO: 85.1 FL (ref 80–96)
MICROALBUMIN UR-MCNC: 9.6 MG/DL
MICROALBUMIN/CREAT RATIO PNL UR: 55.2 MG/G (ref 0–30)
MONOCYTES # BLD AUTO: 0.67 K/UL (ref 0–0.8)
MONOCYTES NFR BLD AUTO: 10.6 % (ref 2–6)
MPC BLD CALC-MCNC: 13.1 FL (ref 9.4–12.4)
NEUTROPHILS # BLD AUTO: 3.81 K/UL (ref 1.8–7.7)
NEUTROPHILS NFR BLD AUTO: 60.4 % (ref 53–65)
NONHDLC SERPL-MCNC: 56 MG/DL
NRBC # BLD AUTO: 0 X10E3/UL
NRBC, AUTO (.00): 0 %
PLATELET # BLD AUTO: 157 K/UL (ref 150–400)
PLATELET MORPHOLOGY: ABNORMAL
POTASSIUM SERPL-SCNC: 4.4 MMOL/L (ref 3.5–5.1)
PROT SERPL-MCNC: 7.3 G/DL (ref 5.8–7.6)
RBC # BLD AUTO: 4.09 M/UL (ref 4.2–5.4)
RBC MORPH BLD: NORMAL
SODIUM SERPL-SCNC: 127 MMOL/L (ref 136–145)
TRIGL SERPL-MCNC: 176 MG/DL (ref 37–140)
VLDLC SERPL-MCNC: 35 MG/DL
WBC # BLD AUTO: 6.32 K/UL (ref 4.5–11)

## 2025-06-25 PROCEDURE — 80053 COMPREHEN METABOLIC PANEL: CPT | Mod: ,,, | Performed by: CLINICAL MEDICAL LABORATORY

## 2025-06-25 PROCEDURE — 85025 COMPLETE CBC W/AUTO DIFF WBC: CPT | Mod: ,,, | Performed by: CLINICAL MEDICAL LABORATORY

## 2025-06-25 PROCEDURE — 80061 LIPID PANEL: CPT | Mod: ,,, | Performed by: CLINICAL MEDICAL LABORATORY

## 2025-06-26 ENCOUNTER — RESULTS FOLLOW-UP (OUTPATIENT)
Dept: FAMILY MEDICINE | Facility: CLINIC | Age: 75
End: 2025-06-26
Payer: MEDICARE

## 2025-06-26 RX ORDER — SEMAGLUTIDE 1.34 MG/ML
1 INJECTION, SOLUTION SUBCUTANEOUS
Qty: 9 ML | Refills: 3 | Status: SHIPPED | OUTPATIENT
Start: 2025-06-26 | End: 2026-06-26

## 2025-06-26 RX ORDER — INSULIN GLARGINE 100 [IU]/ML
15 INJECTION, SOLUTION SUBCUTANEOUS DAILY
Qty: 15 ML | Refills: 1 | Status: SHIPPED | OUTPATIENT
Start: 2025-06-26 | End: 2026-06-26

## 2025-06-26 NOTE — PROGRESS NOTES
"Patient has not been taking Tresiba for "quite some time" due to cost. I Spoke with Dr. Greenberg. She avised increasing Ozempic to 1mg and changing to Lantus 15units daily  "

## 2025-06-27 LAB — HBA1C MFR BLD: 15 % (ref 4–5.6)

## 2025-07-02 DIAGNOSIS — E11.69 TYPE 2 DIABETES MELLITUS WITH OTHER SPECIFIED COMPLICATION, WITH LONG-TERM CURRENT USE OF INSULIN: Primary | ICD-10-CM

## 2025-07-02 DIAGNOSIS — Z79.4 TYPE 2 DIABETES MELLITUS WITH OTHER SPECIFIED COMPLICATION, WITH LONG-TERM CURRENT USE OF INSULIN: Primary | ICD-10-CM

## 2025-07-16 ENCOUNTER — OFFICE VISIT (OUTPATIENT)
Dept: ORTHOPEDICS | Facility: CLINIC | Age: 75
End: 2025-07-16
Payer: MEDICARE

## 2025-07-16 ENCOUNTER — HOSPITAL ENCOUNTER (OUTPATIENT)
Dept: RADIOLOGY | Facility: HOSPITAL | Age: 75
Discharge: HOME | End: 2025-07-16
Attending: ORTHOPAEDIC SURGERY
Payer: MEDICARE

## 2025-07-16 VITALS — BODY MASS INDEX: 25.96 KG/M2 | WEIGHT: 175.25 LBS | HEIGHT: 69 IN

## 2025-07-16 DIAGNOSIS — S42.331D CLOSED DISPLACED OBLIQUE FRACTURE OF SHAFT OF RIGHT HUMERUS WITH ROUTINE HEALING, SUBSEQUENT ENCOUNTER: Primary | ICD-10-CM

## 2025-07-16 DIAGNOSIS — S42.331D CLOSED DISPLACED OBLIQUE FRACTURE OF SHAFT OF RIGHT HUMERUS WITH ROUTINE HEALING, SUBSEQUENT ENCOUNTER: ICD-10-CM

## 2025-07-16 PROCEDURE — 99214 OFFICE O/P EST MOD 30 MIN: CPT | Mod: PBBFAC,25 | Performed by: ORTHOPAEDIC SURGERY

## 2025-07-16 PROCEDURE — 1101F PT FALLS ASSESS-DOCD LE1/YR: CPT | Mod: CPTII,,, | Performed by: ORTHOPAEDIC SURGERY

## 2025-07-16 PROCEDURE — 1160F RVW MEDS BY RX/DR IN RCRD: CPT | Mod: CPTII,,, | Performed by: ORTHOPAEDIC SURGERY

## 2025-07-16 PROCEDURE — 99999 PR PBB SHADOW E&M-EST. PATIENT-LVL IV: CPT | Mod: PBBFAC,,, | Performed by: ORTHOPAEDIC SURGERY

## 2025-07-16 PROCEDURE — 1159F MED LIST DOCD IN RCRD: CPT | Mod: CPTII,,, | Performed by: ORTHOPAEDIC SURGERY

## 2025-07-16 PROCEDURE — 73060 X-RAY EXAM OF HUMERUS: CPT | Mod: TC,RT

## 2025-07-16 PROCEDURE — 3288F FALL RISK ASSESSMENT DOCD: CPT | Mod: CPTII,,, | Performed by: ORTHOPAEDIC SURGERY

## 2025-07-16 PROCEDURE — 99024 POSTOP FOLLOW-UP VISIT: CPT | Mod: ,,, | Performed by: ORTHOPAEDIC SURGERY

## 2025-07-16 NOTE — PATIENT INSTRUCTIONS
You will be scheduled with [facility] for EMG studies. They will be calling you with appointment date and time.   Address is [facility] [address] [place]

## 2025-07-16 NOTE — PROGRESS NOTES
CLINIC NOTE       Chief Complaint   Patient presents with    Right Upper Arm - Follow-up        Luna Wolf is a 74 y.o. female seen today for recheck of her right upper extremity.  She underwent IM nailing right mid shaft humeral fracture 11 weeks ago.  This has superimposed on a chronic cuff arthropathy of the right shoulder.  Additionally she has decreased sensation in the median nerve distribution of both hands suggestive of carpal tunnel syndrome.  Additionally we have discussed possibility of reverse total shoulder replacement arthroplasty on the right side after humeral fracture is healed if she desires to do so.    Past Medical History:   Diagnosis Date    Breast cancer in female     Cancer     Diabetes mellitus     Hypertension     Mixed hyperlipidemia      Family History   Problem Relation Name Age of Onset    Hypertension Brother      Diabetes Brother       Medications Ordered Prior to Encounter[1]    ROS     There were no vitals filed for this visit.    Past Surgical History:   Procedure Laterality Date    BREAST BIOPSY      CERVICAL DISC SURGERY      INTRAMEDULLARY RODDING OF HUMERUS Right 4/24/2025    Procedure: INSERTION, INTRAMEDULLARY JOHNNA, HUMERUS;  Surgeon: Zach Campos MD;  Location: [facility];  Service: Orthopedics;  Laterality: Right;    TUBAL LIGATION          Review of patient's allergies indicates:   Allergen Reactions    Amlodipine Other (See Comments)    Candesartan      Other Reaction(s): nausea/vomiting,     Pravastatin Other (See Comments)    Ibuprofen Nausea And Vomiting     Caused upset stomache    Metformin Nausea Only        Ortho Exam : Right shoulder has a normal contour.  Incisions about the right upper extremity are well healed without signs of infection.  She can abduct 60° independent of scapulothoracic motion due to underlying cuff arthropathy.  That has decreased sensation in the median nerve distribution of both hands    Radiographic Examination:   "Right humerus 07/15/2025    Technique:  Two views AP and lateral    Findings:  He has moderate generalized osteopenia.  There is a fracture of the mid humeral diaphysis remaining in good position alignment and spanned by intramedullary nail with 2 proximal and 1 distal locking screws.  That has been progressive callus formation seen at the fracture site but full union in his not felt to be present that has yet    Impression:   See Above    Assessment and Plan  Patient Active Problem List    Diagnosis Date Noted    Closed displaced oblique fracture of shaft of right humerus 04/23/2025    Stage 3b chronic kidney disease 03/01/2024    Type 2 diabetes mellitus, with long-term current use of insulin 08/28/2023    Essential hypertension 08/28/2023    Mixed hyperlipidemia 08/28/2023    Cervical spinal stenosis 08/28/2023    Hx of colonic polyps 12/28/2022    Diverticulosis of colon 12/28/2022    Impression:  1. Healing right humeral diaphyseal fracture 2.  Suspect bilateral carpal tunnel syndrome both hand  Plan:  1.  That has schedule EMG/nerve conduction studies both upper extremities recheck with results.        Zach Campos M.D.                   [1]   Current Outpatient Medications on File Prior to Visit   Medication Sig Dispense Refill    aspirin (ECOTRIN) 81 MG EC tablet Take 81 mg by mouth once daily.      ATACAND HCT 32-25 mg Tab Take 1 tablet by mouth once daily. 90 each 1    BD ULTRA-FINE MINI PEN NEEDLE 31 gauge x 3/16" Ndle USE AS DIRECTED WITH VICTOZA 100 each 5    blood sugar diagnostic (ACCU-CHEK GUIDE TEST STRIPS) Strp 1 strip by Misc.(Non-Drug; Combo Route) route 4 (four) times daily with meals and nightly. Dx: Type II DM - insulin dependent with hyperglycemia E11.65 300 strip 5    fexofenadine (ALLEGRA ALLERGY) 60 MG tablet Take 1 tablet (60 mg total) by mouth daily as needed (allergies). 90 tablet 1    fluticasone propionate (FLONASE) 50 mcg/actuation nasal spray 1 spray (50 mcg total) by Each " Nostril route 2 (two) times daily as needed for Rhinitis. 16 g 0    glipiZIDE (GLUCOTROL) 10 MG tablet Take 1 tablet (10 mg total) by mouth 2 (two) times daily before meals. 180 tablet 1    insulin glargine U-100, Lantus, (LANTUS SOLOSTAR U-100 INSULIN) 100 unit/mL (3 mL) InPn pen Inject 15 Units into the skin once daily. 15 mL 1    lancets (ACCU-CHEK SOFTCLIX LANCETS) Misc 1 each by Misc.(Non-Drug; Combo Route) route as needed (AS NEEDED). Dx: Type II DM E11.9 300 each 3    montelukast (SINGULAIR) 10 mg tablet Take 1 tablet (10 mg total) by mouth once daily. 90 tablet 1    rosuvastatin (CRESTOR) 10 MG tablet Take 1 tablet (10 mg total) by mouth every evening. 90 tablet 1    semaglutide (OZEMPIC) 1 mg/dose (4 mg/3 mL) Inject 1 mg into the skin every 7 days. 9 mL 3    ACCU-CHEK GAY PLUS TEST STRP Strp For home blood glucose monitoring three times daily. Dx: Type II DM E11.9 (Patient not taking: Reported on 7/16/2025) 300 strip 3    blood-glucose meter kit For 3 times daily home blood glucose monitoring Dx: Type II DM with hyperglycemia Insulin Dependent E11.65 1 each 0     No current facility-administered medications on file prior to visit.

## 2025-08-04 ENCOUNTER — TELEPHONE (OUTPATIENT)
Dept: ORTHOPEDICS | Facility: CLINIC | Age: 75
End: 2025-08-04
Payer: MEDICARE

## 2025-08-04 NOTE — TELEPHONE ENCOUNTER
Talked with dental office and let them know patient does not need to be on antibiotics for just a routine cleaning. Office verbalized understanding and was thankful.

## 2025-08-04 NOTE — TELEPHONE ENCOUNTER
Copied from CRM #[id number]. Topic: Appointments - Appointment Confirmation  >> Aug 4, 2025  9:34 AM Bill wrote:  Who Called: [facility]     Caller is requesting assistance/information from provider's office.    Symptoms (please be specific): pt called to schedule dentist [facility] is calling making sure the pt is ok to have a cleaning since her most recent surgery on the 4/24 they are also wanting to know if she needs any pre med antibiotics     [phone]

## (undated) DEVICE — SLING ARM LARGE FOAM STRAP

## (undated) DEVICE — GLOVE SENSICARE PI GRN 7.5

## (undated) DEVICE — IMPLANTABLE DEVICE
Type: IMPLANTABLE DEVICE | Site: HUMERUS | Status: NON-FUNCTIONAL
Removed: 2025-04-24

## (undated) DEVICE — STAPLER SKIN WIDE

## (undated) DEVICE — SPONGE COTTON TRAY 4X4IN

## (undated) DEVICE — BIT DRILL AO 3.5X230MM STERILE

## (undated) DEVICE — Device

## (undated) DEVICE — TUBE EXCHANGE 8 MM TEFLON STRL

## (undated) DEVICE — POUCH INSTRUMENT 2 POCKET

## (undated) DEVICE — GUIDE WIRE SMOOTH TIP 22X800MM
Type: IMPLANTABLE DEVICE | Site: HUMERUS | Status: NON-FUNCTIONAL
Removed: 2025-04-24

## (undated) DEVICE — GOWN POLY REINF BRTH SLV XL

## (undated) DEVICE — K-WIRE, STERILE
Type: IMPLANTABLE DEVICE | Site: HUMERUS | Status: NON-FUNCTIONAL
Removed: 2025-04-24

## (undated) DEVICE — SUT VICRYL 2-0 36 CT-1

## (undated) DEVICE — GLOVE SENSICARE PI SURG 7.5

## (undated) DEVICE — GLOVE SENSICARE PI GRN 8

## (undated) DEVICE — SOL NACL IRR 1000ML BTL

## (undated) DEVICE — BAG RECTANGLE RBBRBND 30X36IN

## (undated) DEVICE — GUIDE WIRE, BALL-TIPPED, STERILE
Type: IMPLANTABLE DEVICE | Site: HUMERUS | Status: NON-FUNCTIONAL
Removed: 2025-04-24

## (undated) DEVICE — REAMER MOD BIXCUT 8X48MM STER.

## (undated) DEVICE — PACK ECLIPSE BASIC III SURG

## (undated) DEVICE — GOWN IMPERVIOUS BLUE XXL

## (undated) DEVICE — APPLICATOR CHLORAPREP ORN 26ML